# Patient Record
Sex: MALE | Race: WHITE | NOT HISPANIC OR LATINO | URBAN - METROPOLITAN AREA
[De-identification: names, ages, dates, MRNs, and addresses within clinical notes are randomized per-mention and may not be internally consistent; named-entity substitution may affect disease eponyms.]

---

## 2017-03-18 ENCOUNTER — INPATIENT (INPATIENT)
Facility: HOSPITAL | Age: 27
LOS: 2 days | Discharge: HOME | End: 2017-03-21
Attending: PSYCHIATRY & NEUROLOGY | Admitting: PSYCHIATRY & NEUROLOGY
Payer: COMMERCIAL

## 2017-03-18 VITALS — HEIGHT: 71 IN | WEIGHT: 229.94 LBS

## 2017-03-18 DIAGNOSIS — R69 ILLNESS, UNSPECIFIED: ICD-10-CM

## 2017-03-18 DIAGNOSIS — F12.19 CANNABIS ABUSE WITH UNSPECIFIED CANNABIS-INDUCED DISORDER: ICD-10-CM

## 2017-03-18 DIAGNOSIS — F25.0 SCHIZOAFFECTIVE DISORDER, BIPOLAR TYPE: ICD-10-CM

## 2017-03-18 LAB
ALBUMIN SERPL ELPH-MCNC: 3.6 G/DL — SIGNIFICANT CHANGE UP (ref 3.3–5)
ALP SERPL-CCNC: 86 U/L — SIGNIFICANT CHANGE UP (ref 40–120)
ALT FLD-CCNC: 46 U/L — SIGNIFICANT CHANGE UP (ref 12–78)
ANION GAP SERPL CALC-SCNC: 9 MMOL/L — SIGNIFICANT CHANGE UP (ref 5–17)
APAP SERPL-MCNC: <2 UG/ML — LOW (ref 10–30)
APPEARANCE UR: CLEAR — SIGNIFICANT CHANGE UP
AST SERPL-CCNC: 25 U/L — SIGNIFICANT CHANGE UP (ref 15–37)
BACTERIA # UR AUTO: (no result)
BASOPHILS # BLD AUTO: 0.2 K/UL — SIGNIFICANT CHANGE UP (ref 0–0.2)
BASOPHILS NFR BLD AUTO: 1.7 % — SIGNIFICANT CHANGE UP (ref 0–2)
BILIRUB SERPL-MCNC: 0.3 MG/DL — SIGNIFICANT CHANGE UP (ref 0.2–1.2)
BILIRUB UR-MCNC: NEGATIVE — SIGNIFICANT CHANGE UP
BUN SERPL-MCNC: 10 MG/DL — SIGNIFICANT CHANGE UP (ref 7–23)
CALCIUM SERPL-MCNC: 8.4 MG/DL — LOW (ref 8.5–10.1)
CHLORIDE SERPL-SCNC: 106 MMOL/L — SIGNIFICANT CHANGE UP (ref 96–108)
CO2 SERPL-SCNC: 27 MMOL/L — SIGNIFICANT CHANGE UP (ref 22–31)
COLOR SPEC: YELLOW — SIGNIFICANT CHANGE UP
CREAT SERPL-MCNC: 0.87 MG/DL — SIGNIFICANT CHANGE UP (ref 0.5–1.3)
DIFF PNL FLD: NEGATIVE — SIGNIFICANT CHANGE UP
EOSINOPHIL # BLD AUTO: 0.2 K/UL — SIGNIFICANT CHANGE UP (ref 0–0.5)
EOSINOPHIL NFR BLD AUTO: 1.8 % — SIGNIFICANT CHANGE UP (ref 0–6)
EPI CELLS # UR: SIGNIFICANT CHANGE UP
ETHANOL SERPL-MCNC: <10 MG/DL — SIGNIFICANT CHANGE UP (ref 0–10)
GLUCOSE SERPL-MCNC: 86 MG/DL — SIGNIFICANT CHANGE UP (ref 70–99)
GLUCOSE UR QL: NEGATIVE MG/DL — SIGNIFICANT CHANGE UP
HCT VFR BLD CALC: 41.1 % — SIGNIFICANT CHANGE UP (ref 39–50)
HGB BLD-MCNC: 14.3 G/DL — SIGNIFICANT CHANGE UP (ref 13–17)
KETONES UR-MCNC: NEGATIVE — SIGNIFICANT CHANGE UP
LEUKOCYTE ESTERASE UR-ACNC: (no result)
LYMPHOCYTES # BLD AUTO: 3.7 K/UL — HIGH (ref 1–3.3)
LYMPHOCYTES # BLD AUTO: 36.6 % — SIGNIFICANT CHANGE UP (ref 13–44)
MCHC RBC-ENTMCNC: 30.2 PG — SIGNIFICANT CHANGE UP (ref 27–34)
MCHC RBC-ENTMCNC: 34.8 GM/DL — SIGNIFICANT CHANGE UP (ref 32–36)
MCV RBC AUTO: 86.6 FL — SIGNIFICANT CHANGE UP (ref 80–100)
MONOCYTES # BLD AUTO: 0.6 K/UL — SIGNIFICANT CHANGE UP (ref 0–0.9)
MONOCYTES NFR BLD AUTO: 6.3 % — SIGNIFICANT CHANGE UP (ref 2–14)
NEUTROPHILS # BLD AUTO: 5.5 K/UL — SIGNIFICANT CHANGE UP (ref 1.8–7.4)
NEUTROPHILS NFR BLD AUTO: 53.6 % — SIGNIFICANT CHANGE UP (ref 43–77)
NITRITE UR-MCNC: NEGATIVE — SIGNIFICANT CHANGE UP
PH UR: 6 — SIGNIFICANT CHANGE UP (ref 4.8–8)
PLATELET # BLD AUTO: 237 K/UL — SIGNIFICANT CHANGE UP (ref 150–400)
POTASSIUM SERPL-MCNC: 3.8 MMOL/L — SIGNIFICANT CHANGE UP (ref 3.5–5.3)
POTASSIUM SERPL-SCNC: 3.8 MMOL/L — SIGNIFICANT CHANGE UP (ref 3.5–5.3)
PROT SERPL-MCNC: 6.9 GM/DL — SIGNIFICANT CHANGE UP (ref 6–8.3)
PROT UR-MCNC: NEGATIVE MG/DL — SIGNIFICANT CHANGE UP
RBC # BLD: 4.75 M/UL — SIGNIFICANT CHANGE UP (ref 4.2–5.8)
RBC # FLD: 11.9 % — SIGNIFICANT CHANGE UP (ref 10.3–14.5)
RBC CASTS # UR COMP ASSIST: (no result) /HPF (ref 0–4)
SALICYLATES SERPL-MCNC: 3.1 MG/DL — SIGNIFICANT CHANGE UP (ref 2.8–20)
SODIUM SERPL-SCNC: 142 MMOL/L — SIGNIFICANT CHANGE UP (ref 135–145)
SP GR SPEC: 1.01 — SIGNIFICANT CHANGE UP (ref 1.01–1.02)
TSH SERPL-MCNC: 0.88 UU/ML — SIGNIFICANT CHANGE UP (ref 0.36–3.74)
UROBILINOGEN FLD QL: NEGATIVE MG/DL — SIGNIFICANT CHANGE UP
WBC # BLD: 10.2 K/UL — SIGNIFICANT CHANGE UP (ref 3.8–10.5)
WBC # FLD AUTO: 10.2 K/UL — SIGNIFICANT CHANGE UP (ref 3.8–10.5)
WBC UR QL: SIGNIFICANT CHANGE UP

## 2017-03-18 PROCEDURE — 99285 EMERGENCY DEPT VISIT HI MDM: CPT

## 2017-03-18 RX ORDER — DIVALPROEX SODIUM 500 MG/1
500 TABLET, DELAYED RELEASE ORAL
Qty: 0 | Refills: 0 | Status: DISCONTINUED | OUTPATIENT
Start: 2017-03-18 | End: 2017-03-21

## 2017-03-18 RX ORDER — DOCUSATE SODIUM 100 MG
100 CAPSULE ORAL DAILY
Qty: 0 | Refills: 0 | Status: DISCONTINUED | OUTPATIENT
Start: 2017-03-18 | End: 2017-03-21

## 2017-03-18 RX ORDER — ACETAMINOPHEN 500 MG
650 TABLET ORAL EVERY 6 HOURS
Qty: 0 | Refills: 0 | Status: DISCONTINUED | OUTPATIENT
Start: 2017-03-18 | End: 2017-03-21

## 2017-03-18 RX ORDER — FLUPHENAZINE HYDROCHLORIDE 1 MG/1
25 TABLET, FILM COATED ORAL ONCE
Qty: 0 | Refills: 0 | Status: COMPLETED | OUTPATIENT
Start: 2017-03-18 | End: 2017-03-18

## 2017-03-18 RX ORDER — DIVALPROEX SODIUM 500 MG/1
250 TABLET, DELAYED RELEASE ORAL AT BEDTIME
Qty: 0 | Refills: 0 | Status: DISCONTINUED | OUTPATIENT
Start: 2017-03-18 | End: 2017-03-21

## 2017-03-18 RX ORDER — FLUPHENAZINE HYDROCHLORIDE 1 MG/1
10 TABLET, FILM COATED ORAL
Qty: 0 | Refills: 0 | Status: DISCONTINUED | OUTPATIENT
Start: 2017-03-18 | End: 2017-03-21

## 2017-03-18 RX ORDER — BENZTROPINE MESYLATE 1 MG
1 TABLET ORAL
Qty: 0 | Refills: 0 | Status: DISCONTINUED | OUTPATIENT
Start: 2017-03-18 | End: 2017-03-21

## 2017-03-18 RX ADMIN — DIVALPROEX SODIUM 250 MILLIGRAM(S): 500 TABLET, DELAYED RELEASE ORAL at 23:05

## 2017-03-18 RX ADMIN — DIVALPROEX SODIUM 500 MILLIGRAM(S): 500 TABLET, DELAYED RELEASE ORAL at 20:48

## 2017-03-18 RX ADMIN — Medication 100 MILLIGRAM(S): at 20:48

## 2017-03-18 RX ADMIN — FLUPHENAZINE HYDROCHLORIDE 25 MILLIGRAM(S): 1 TABLET, FILM COATED ORAL at 13:30

## 2017-03-18 RX ADMIN — Medication 1 MILLIGRAM(S): at 20:48

## 2017-03-18 RX ADMIN — Medication 1 MILLIGRAM(S): at 21:00

## 2017-03-18 NOTE — ED STATDOCS - NS ED MD SCRIBE ATTENDING SCRIBE SECTIONS
INTAKE ASSESSMENT/SCREENINGS/RESULTS/HIV/PROGRESS NOTE/HISTORY OF PRESENT ILLNESS/REVIEW OF SYSTEMS/PAST MEDICAL/SURGICAL/SOCIAL HISTORY/PHYSICAL EXAM

## 2017-03-18 NOTE — ED PROVIDER NOTE - MEDICAL DECISION MAKING DETAILS
M schizophrenic ambulatory to ED w/ susan re: recent worsening clinically of his psych. cond.  [facility] didn't have his IM Prolixin yesterday at his appt.  Labs, IM Prolixin, 1:1, UTox, observe, reassess.    Psych consult.

## 2017-03-18 NOTE — ED BEHAVIORAL HEALTH ASSESSMENT NOTE - SUICIDE RISK FACTORS
Agitation/severe anxiety/Perceived burden on family and others/Command hallucinations to hurt self/Highly impulsive behavior/Substance abuse/dependence

## 2017-03-18 NOTE — ED ADULT NURSE NOTE - OBJECTIVE STATEMENT
Pt reports he needs refill for his prolyxin.  Per family they were able to fill prescription this a.m. however pt did not have syringe for administration.  Family also states that pt has bee "manic" and that he normally is on prolyxin q4wks and that dosage was changed to q5 weeks.  Pt appears anxious, is cooperative.

## 2017-03-18 NOTE — ED BEHAVIORAL HEALTH ASSESSMENT NOTE - DESCRIPTION
Patient was agitated on arrival by cooperated with injection and has been relative calm since. none single, never , has been living with mother and her boyfriend for the past 4 years

## 2017-03-18 NOTE — ED BEHAVIORAL HEALTH ASSESSMENT NOTE - SUMMARY
Patient is a 26 year old single white male with a 5 or more year history of severe chronic mental illness and substance use disorder (THC primarily).  Patient was agitated and threatening this morning so his mother and her boyfriend brought him to the hospital, reporting that he has been noncompliant and out of control.  Patient was given Prolixin Dec 25mg IM this morning and was generally calm for my interview this evening.  Despite this family can not manage him at home in this the state he has been in for the past few weeks.  He needs further psychiatric evaluation and to be more stable in mood and behavior. He agreed to a 9.13 admission with the hopes of a short hospitalization and "fine tuning" his medications.  Dr. Jennifer Kelly assigned to his case.

## 2017-03-18 NOTE — ED BEHAVIORAL HEALTH ASSESSMENT NOTE - CURRENT MEDICATION
Depakote ER 1250 mg po hs  Prolixin 10 mg po q 12 hr  cogentin 1mg,  prolixin decanoate 100 mg q 4 weeks  lexapro 20 mg po q am

## 2017-03-18 NOTE — ED PROVIDER NOTE - CHPI ED SYMPTOMS NEG
no disorientation/no suicidal/no agitation/no hallucinations/no change in level of consciousness/no confusion

## 2017-03-18 NOTE — ED BEHAVIORAL HEALTH ASSESSMENT NOTE - HPI (INCLUDE ILLNESS QUALITY, SEVERITY, DURATION, TIMING, CONTEXT, MODIFYING FACTORS, ASSOCIATED SIGNS AND SYMPTOMS)
Patient has five year history of severe chronic mental illness and substance abuse.  Discharged from  with working diagnosis of schizoaffective disorder,  5 inpatient psychiatric treatments for the same diagnosis, last being May and June 2016. Patient also has a THC use disorder, and history of alcohol use disorder as well. The patient was discharged from  on depakote 1250mg daily, prolixin 10 mg po q 12 hr + prolixin decanoate 100mg q 3 weeks and assigned an ACT team associated with [facility].  According to the patient Dr. Duckworth, was willing to let him go increase the duration between   decanoate injection to q 4 weeks from q 3. The patient mother says she has to get him to go to get his injection. His mother's boyfriend says the patient has been driving under the influence of THC in a dangerous manner. The patient mother reports increase THC use, hanging out and being taken advantage by drug using persons, and she believes he may be taking other drugs.  She reports the girl friend he describes is not real, and that this morning he talks to persons not there and was agitated and manic.  Patient admits to hearing voices earlier today, but reports feeling better after he got a shot.

## 2017-03-18 NOTE — ED PROVIDER NOTE - CONSTITUTIONAL, MLM
normal... Well appearing, well nourished, awake, alert, oriented to person, place, time/situation and in no apparent distress. Non toxic

## 2017-03-18 NOTE — ED PROVIDER NOTE - DETAILS:
The scribe's documentation has been prepared under my direction and personally reviewed by me in its entirety. I confirm that the note above accurately reflects all work, treatment, procedures, and medical decision making performed by me (Dr. Johnson).

## 2017-03-18 NOTE — ED ADULT NURSE NOTE - OTHER COMPLAINTS
pt need injection of prolexin, pt brought own medication but does not have needles for injection pt need injection of prolixin, pt brought own medication but does not have needles for injection

## 2017-03-18 NOTE — ED STATDOCS - PROGRESS NOTE DETAILS
25 y/o M PMHx Schizophrenia, smokes, marijuana, etoh use, presents to the ED s/p meds refill. The pt has agitation, talks to himself, drinking etoh and marijuana. The pt has missed his injection which he takes every 4 weeks. Pt's mother notes that the pt said "lets get f*** up and die. No  cp, sob, headache, fever, chills, dizziness, abd pain or nvd. Charlette evaluation in [facility].

## 2017-03-18 NOTE — ED ADULT NURSE REASSESSMENT NOTE - NS ED NURSE REASSESS COMMENT FT1
per mother pt does have hx hearing voices, mother states she observed pt talking to self prior to arrival.

## 2017-03-18 NOTE — ED BEHAVIORAL HEALTH ASSESSMENT NOTE - SUICIDE PROTECTIVE FACTORS
Future oriented/Engaged in work or school/Positive therapeutic relationships/Responsibility to family and others

## 2017-03-18 NOTE — ED BEHAVIORAL HEALTH ASSESSMENT NOTE - DETAILS
patient reported slammed his hand on a table today and kicking things, has been aggressive to property in the past grandmother had schizophrenia father had history of cocaine use, uncle had meth abuse, none Dr. Barnard 5 N

## 2017-03-19 RX ADMIN — DIVALPROEX SODIUM 500 MILLIGRAM(S): 500 TABLET, DELAYED RELEASE ORAL at 21:08

## 2017-03-19 RX ADMIN — FLUPHENAZINE HYDROCHLORIDE 10 MILLIGRAM(S): 1 TABLET, FILM COATED ORAL at 21:08

## 2017-03-19 RX ADMIN — DIVALPROEX SODIUM 250 MILLIGRAM(S): 500 TABLET, DELAYED RELEASE ORAL at 21:09

## 2017-03-19 RX ADMIN — Medication 1 MILLIGRAM(S): at 21:08

## 2017-03-19 RX ADMIN — DIVALPROEX SODIUM 500 MILLIGRAM(S): 500 TABLET, DELAYED RELEASE ORAL at 09:13

## 2017-03-19 RX ADMIN — Medication 100 MILLIGRAM(S): at 09:13

## 2017-03-19 RX ADMIN — FLUPHENAZINE HYDROCHLORIDE 10 MILLIGRAM(S): 1 TABLET, FILM COATED ORAL at 09:13

## 2017-03-19 RX ADMIN — Medication 1 MILLIGRAM(S): at 09:13

## 2017-03-19 NOTE — PROGRESS NOTE BEHAVIORAL HEALTH - NSBHFUPADDHPIFT_PSY_A_CORE
agitated, manic, talking to himself    Patient has five year history of severe chronic mental illness and substance abuse.  Discharged from  with working diagnosis of schizoaffective disorder,  5 inpatient psychiatric treatments for the same diagnosis, last being May and June 2016. Patient also has a THC use disorder, and history of alcohol use disorder as well. The patient was discharged from  on depakote 1250mg daily, prolixin 10 mg po q 12 hr + prolixin decanoate 100mg q 3 weeks and assigned an ACT team associated with [facility].  According to the patient Dr. Duckworth, was willing to let him go increase the duration between   decanoate injection to q 4 weeks from q 3. The patient mother says she has to get him to go to get his injection. His mother's boyfriend says the patient has been driving under the influence of THC in a dangerous manner. The patient mother reports increase THC use, hanging out and being taken advantage by drug using persons, and she believes he may be taking other drugs.  She reports the girl friend he describes is not real, and that this morning he talks to persons not there and was agitated and manic.  Patient admits to hearing voices earlier today, but reports feeling better after he got a shot.    Depakote ER 1250 mg po hs  Prolixin 10 mg po q 12 hr  cogentin 1mg,  prolixin decanoate 100 mg q 4 weeks  lexapro 20 mg po q am agitated, manic, talking to himself    Patient has five year history of severe chronic mental illness and substance abuse.  Discharged from  with working diagnosis of schizoaffective disorder,  5 inpatient psychiatric treatments for the same diagnosis, last being May and June 2016. Patient also has a THC use disorder, and history of alcohol use disorder as well. The patient was discharged from  on depakote 1250mg daily, prolixin 10 mg po q 12 hr + prolixin decanoate 100mg q 3 weeks and assigned an ACT team associated with [facility].  According to the patient Dr. Duckworth, was willing to let him go increase the duration between   decanoate injection to q 4 weeks from q 3. The patient mother says she has to get him to go to get his injection. His mother's boyfriend says the patient has been driving under the influence of THC in a dangerous manner. The patient mother reports increase THC use, hanging out and being taken advantage by drug using persons, and she believes he may be taking other drugs.  She reports the girl friend he describes is not real, and that this morning he talks to persons not there and was agitated and manic.  Patient admits to hearing voices earlier today, but reports feeling better after he got a shot.    Depakote ER 1250 mg po hs  Prolixin 10 mg po q 12 hr  cogentin 1mg,  prolixin decanoate 100 mg q 4 weeks  lexapro 20 mg po q am    was given prolixin dec 25 mg in ED on 3/18

## 2017-03-20 DIAGNOSIS — F10.10 ALCOHOL ABUSE, UNCOMPLICATED: ICD-10-CM

## 2017-03-20 LAB — VALPROATE SERPL-MCNC: 76 UG/ML — SIGNIFICANT CHANGE UP (ref 50–100)

## 2017-03-20 RX ADMIN — FLUPHENAZINE HYDROCHLORIDE 10 MILLIGRAM(S): 1 TABLET, FILM COATED ORAL at 09:32

## 2017-03-20 RX ADMIN — Medication 1 MILLIGRAM(S): at 09:32

## 2017-03-20 RX ADMIN — FLUPHENAZINE HYDROCHLORIDE 10 MILLIGRAM(S): 1 TABLET, FILM COATED ORAL at 21:43

## 2017-03-20 RX ADMIN — Medication 100 MILLIGRAM(S): at 09:32

## 2017-03-20 RX ADMIN — Medication 1 MILLIGRAM(S): at 21:43

## 2017-03-20 RX ADMIN — DIVALPROEX SODIUM 250 MILLIGRAM(S): 500 TABLET, DELAYED RELEASE ORAL at 21:43

## 2017-03-20 RX ADMIN — DIVALPROEX SODIUM 500 MILLIGRAM(S): 500 TABLET, DELAYED RELEASE ORAL at 09:32

## 2017-03-20 RX ADMIN — DIVALPROEX SODIUM 500 MILLIGRAM(S): 500 TABLET, DELAYED RELEASE ORAL at 21:43

## 2017-03-20 RX ADMIN — Medication 1 MILLIGRAM(S): at 16:07

## 2017-03-20 NOTE — PROGRESS NOTE BEHAVIORAL HEALTH - NSBHFUPINTERVALCCFT_PSY_A_CORE
" I think I am doing fine. I just get these double and triple thoughts and it makes it hard to concentrate sometimes."
Reports he is not hearing voices.  Would like to return to school and work  3 day letter submited as he wishes to be discharged

## 2017-03-20 NOTE — CONSULT NOTE ADULT - SUBJECTIVE AND OBJECTIVE BOX
PCP - Philip Millan ([facility])    HISTORY OF THE PRESENT ILLNESS:  26 M with Hx of Schizophrenia on several antipsychotic meds presented to the ED after he felt that his Prolixin injection was [facility]  While in the ED, his mother was concerned about his recent behavior and his schizophrenia was getting worse.  Apparently, he has been drinking alcohol and using marijuana more frequently.    PAST MEDICAL HISTORY:  Schizophrenia    PAST SURGICAL HISTORY:  NONE    FAMILY HISTORY:   non-contributory to the patient's current presentation    SOCIAL HISTORY:  +smokes 1pk/day, used marijuana 4 days ago, no alcohol    REVIEW OF SYSTEMS:   All 10 systems reviewed in detailed and found to be negative with the exception of what has already been described above    MEDICATIONS  (STANDING):  fluPHENAZine 10milliGRAM(s) Oral two times a day  benztropine 1milliGRAM(s) Oral two times a day  diVALproex DR 500milliGRAM(s) Oral two times a day  diVALproex DR 250milliGRAM(s) Oral at bedtime  docusate sodium 100milliGRAM(s) Oral daily    MEDICATIONS  (PRN):  LORazepam     Tablet 1milliGRAM(s) Oral four times a day PRN Agitation  LORazepam   Injectable 2milliGRAM(s) IntraMuscular once PRN severe agitation  acetaminophen   Tablet 650milliGRAM(s) Oral every 6 hours PRN pain  aluminum hydroxide/magnesium hydroxide/simethicone Suspension 30milliLiter(s) Oral every 4 hours PRN Dyspepsia      VITALS SIGNS:  T(F): 99.6, Max: 99.6 (03-20 @ 07:29)  HR: 56 (56 - 56)  BP: 111/73 (111/73 - 111/73)  RR: 16 (16 - 16)  SpO2: 100% (100% - 100%)  Wt(kg): --      PHYSICAL EXAM:    HEENT:  pupils equal and reactive, EOMI, no oropharyngeal lesions, erythema, exudates, oral thrush    NECK:   supple, no carotid bruits, no palpable lymph nodes, no thyromegaly    CV:  +S1, +S2, regular, no murmurs or rubs    RESP:   lungs clear to auscultation bilaterally, no wheezing, rales, rhonchi, good air entry bilaterally    BREAST:  not examined    GI:  abdomen soft, non-tender, non-distended, normal BS, no bruits, no abdominal masses, no palpable masses    RECTAL:  not examined    :  not examined    MSK:   normal muscle tone, no atrophy, no rigidity, no contractions    EXT:   no clubbing, no cyanosis, no edema, no calf pain, swelling or erythema    VASCULAR:  pulses equal and symmetric in the upper and lower extremities    NEURO:  AAOX3, no focal neurological deficits, follows all commands, able to move extremities spontaneously    SKIN:  no ulcers, lesions or rashes    LABS:                            14.3   10.2  )-----------( 237      ( 18 Mar 2017 15:01 )             41.1     18 Mar 2017 15:01    142    |  106    |  10     ----------------------------<  86     3.8     |  27     |  0.87     Ca    8.4        18 Mar 2017 15:01    TPro  6.9    /  Alb  3.6    /  TBili  0.3    /  DBili  x      /  AST  25     /  ALT  46     /  AlkPhos  86     18 Mar 2017 15:01    LIVER FUNCTIONS - ( 18 Mar 2017 15:01 )  Alb: 3.6 g/dL / Pro: 6.9 gm/dL / ALK PHOS: 86 U/L / ALT: 46 U/L / AST: 25 U/L / GGT: x           Urinalysis Basic - ( 18 Mar 2017 15:01 )    Color: Yellow / Appearance: Clear / S.015 / pH: x  Gluc: x / Ketone: Negative  / Bili: Negative / Urobili: Negative mg/dL   Blood: x / Protein: Negative mg/dL / Nitrite: Negative   Leuk Esterase: Trace / RBC: 3-5 /HPF / WBC 0-2   Sq Epi: x / Non Sq Epi: Occasional / Bacteria: Few      IMPRESSION:    ACUTE PSYCHOSIS IN PATIENT WITH CHRONIC SCHIZOPHRENIA    SMOKING DEPENDENCE        RECOMMENDATIONS:  -  medically stable for admission to   -  primary management per psych  -  continue anti-psychotic meds per psych  -  counseled on smoking cessation  -  patient refusing Nicotine patch at this time  -  will sign off    d/w patient and psych RN

## 2017-03-20 NOTE — PROGRESS NOTE BEHAVIORAL HEALTH - NSBHCHARTREVIEWVS_PSY_A_CORE FT
Vital Signs Last 24 Hrs  T(C): 36.6, Max: 36.7 (03-18 @ 19:49)  T(F): 97.8, Max: 98 (03-18 @ 19:49)  HR: 57 (57 - 68)  BP: 127/67 (121/65 - 127/67)  BP(mean): --  RR: 18 (17 - 18)  SpO2: 100% (97% - 100%)
Vital Signs Last 24 Hrs  T(C): 36.6, Max: 36.7 (03-18 @ 19:49)  T(F): 97.8, Max: 98 (03-18 @ 19:49)  HR: 57 (57 - 68)  BP: 127/67 (121/65 - 127/67)  BP(mean): --  RR: 18 (17 - 18)  SpO2: 100% (97% - 100%)

## 2017-03-20 NOTE — PROGRESS NOTE BEHAVIORAL HEALTH - NSBHFUPINTERVALHXFT_PSY_A_CORE
MEDICATIONS  (STANDING):  fluPHENAZine 10milliGRAM(s) Oral two times a day  benztropine 1milliGRAM(s) Oral two times a day  diVALproex DR 500milliGRAM(s) Oral two times a day  diVALproex DR 250milliGRAM(s) Oral at bedtime  docusate sodium 100milliGRAM(s) Oral daily    MEDICATIONS  (PRN):  LORazepam     Tablet 1milliGRAM(s) Oral four times a day PRN Agitation  LORazepam   Injectable 2milliGRAM(s) IntraMuscular once PRN severe agitation  acetaminophen   Tablet 650milliGRAM(s) Oral every 6 hours PRN pain  aluminum hydroxide/magnesium hydroxide/simethicone Suspension 30milliLiter(s) Oral every 4 hours PRN Dyspepsia
Pt seen. Pt remains somewhat thought disordered with what pt describes as " double or triple thoughts  that get in my way ."   Lengthy discussion with the pt as to the strong medical recommendation that the pt not continue his heavy THC use and intermittent ETOH use. Pt appeared distracted and baffled by hospital staff and outpatient treatment team concerns regarding the pt's comorbid, significant substance use disorders.   Pt continued to try to minimize and to downplay his comorbid substance use.   Pt spoke of plans to continue his job at a local [employer] and to resume his classes at [employer] where the pt is taking statistics  and 'effective reading' courses with "80's " grades.  Pt reported good sleep and appetite since his admission to hospital . He denied SI HI and denied current AH though pt spoke of ' When I get into an argument or I get upset, that's when the voices come. I guess I am weak."  Pt was amenable to continue current med regimen of PO Prolixin and decanoate , though the decanoate current  dose  was apparently lowered to 25 mg IM  q 4 weeks rather than prior dose of Prolixin 100 mg IM q 4 weeks  at the time of pt DC from hospital during pt's last inpatient admission in 2016.with interval increased to q 4 weeks.   Writer reviewed the pt's medication regimen and the purpose of each medication along with the need at times to change the timing of doses according to the pt's clinical symptoms. Pt amenable to resume q 3 weekly Prolixin decanoate at this time due to the pt's recent worsening psychosis .  Discussion with hospital staff reporting that [facility] has decided to not have the pt return there for treatment in light of the pt's having not followed up as planned with the ACT team  after the pt's most recent DC from [facility] in 2016.  The pt 's judgment and insight remain rather poor.  The pt submitted a 72 hr letter requesting DC home . (3/21/17) . Pt amenable to allow additional time to reschedule pt outpatient  follow up . Pt continues to decline all staff efforts to have the pt involved in dual diagnosis treatment of his SAD and his comorbid and significant THC and ETOH use disorders.

## 2017-03-20 NOTE — PROGRESS NOTE BEHAVIORAL HEALTH - NSBHCHARTREVIEWINVESTIGATE_PSY_A_CORE FT
MEDICATIONS  (STANDING):  fluPHENAZine 10milliGRAM(s) Oral two times a day  benztropine 1milliGRAM(s) Oral two times a day  diVALproex DR 500milliGRAM(s) Oral two times a day  diVALproex DR 250milliGRAM(s) Oral at bedtime  docusate sodium 100milliGRAM(s) Oral daily    MEDICATIONS  (PRN):  LORazepam     Tablet 1milliGRAM(s) Oral four times a day PRN Agitation  LORazepam   Injectable 2milliGRAM(s) IntraMuscular once PRN severe agitation  acetaminophen   Tablet 650milliGRAM(s) Oral every 6 hours PRN pain  aluminum hydroxide/magnesium hydroxide/simethicone Suspension 30milliLiter(s) Oral every 4 hours PRN Dyspepsia

## 2017-03-20 NOTE — PROGRESS NOTE BEHAVIORAL HEALTH - RISK ASSESSMENT
History of aggression to property  Psychotic illness and substance use are risk factors.     Low risk for suicide or aggression on unit as he is accepting treatment and has limited psychotic symptoms
History of aggression to property  Psychotic illness and substance use are risk factors.     Low risk for suicide or aggression on unit as he is accepting treatment and has limited psychotic symptoms

## 2017-03-20 NOTE — PROGRESS NOTE BEHAVIORAL HEALTH - NSBHADMITIPREASON_PSY_A_CORE
Danger to self; mental illness expected to respond to inpatient care
Danger to self; mental illness expected to respond to inpatient care

## 2017-03-20 NOTE — PROGRESS NOTE BEHAVIORAL HEALTH - SUMMARY
Patient is a 26 year old single white male with a 5 or more year history of severe chronic mental illness and substance use disorder (THC primarily).  Patient was agitated and threatening yesterday morning so his mother and her boyfriend brought him to the hospital, reporting that he has been noncompliant and out of control.    Patient was given Prolixin Dec 25mg IM this morning and was generally calm for my interview this evening.  Despite this family can not manage him at home in this the state he has been in for the past few weeks.  He needs further psychiatric evaluation and to be more stable in mood and behavior. He agreed to a 9.13 admission with the hopes of a short hospitalization and "fine tuning" his medications.
Patient is a 26 year old single white male with a 5 or more year history of severe chronic mental illness and substance use disorder (THC primarily).  Patient was agitated and threatening yesterday morning so his mother and her boyfriend brought him to the hospital, reporting that he has been noncompliant and out of control.    Patient was given Prolixin Dec 25mg IM this morning and was generally calm for my interview this evening.  Despite this family can not manage him at home in this the state he has been in for the past few weeks.  He needs further psychiatric evaluation and to be more stable in mood and behavior. He agreed to a 9.13 admission with the hopes of a short hospitalization and "fine tuning" his medications.

## 2017-03-21 VITALS
TEMPERATURE: 98 F | DIASTOLIC BLOOD PRESSURE: 62 MMHG | RESPIRATION RATE: 16 BRPM | HEART RATE: 116 BPM | SYSTOLIC BLOOD PRESSURE: 102 MMHG | OXYGEN SATURATION: 100 %

## 2017-03-21 RX ORDER — BENZTROPINE MESYLATE 1 MG
1 TABLET ORAL
Qty: 28 | Refills: 1
Start: 2017-03-21 | End: 2017-04-17

## 2017-03-21 RX ORDER — FLUPHENAZINE HYDROCHLORIDE 1 MG/1
25 TABLET, FILM COATED ORAL ONCE
Qty: 0 | Refills: 0 | Status: COMPLETED | OUTPATIENT
Start: 2017-03-21 | End: 2017-03-21

## 2017-03-21 RX ORDER — DIVALPROEX SODIUM 500 MG/1
1 TABLET, DELAYED RELEASE ORAL
Qty: 14 | Refills: 1
Start: 2017-03-21 | End: 2017-04-17

## 2017-03-21 RX ORDER — DIVALPROEX SODIUM 500 MG/1
1 TABLET, DELAYED RELEASE ORAL
Qty: 28 | Refills: 1
Start: 2017-03-21 | End: 2017-04-17

## 2017-03-21 RX ORDER — FLUPHENAZINE HYDROCHLORIDE 1 MG/1
1 TABLET, FILM COATED ORAL
Qty: 0 | Refills: 0 | DISCHARGE
Start: 2017-03-21

## 2017-03-21 RX ADMIN — DIVALPROEX SODIUM 500 MILLIGRAM(S): 500 TABLET, DELAYED RELEASE ORAL at 09:42

## 2017-03-21 RX ADMIN — FLUPHENAZINE HYDROCHLORIDE 25 MILLIGRAM(S): 1 TABLET, FILM COATED ORAL at 11:43

## 2017-03-21 RX ADMIN — FLUPHENAZINE HYDROCHLORIDE 10 MILLIGRAM(S): 1 TABLET, FILM COATED ORAL at 09:42

## 2017-03-21 RX ADMIN — Medication 100 MILLIGRAM(S): at 09:42

## 2017-03-21 RX ADMIN — Medication 1 MILLIGRAM(S): at 09:42

## 2017-03-21 NOTE — DISCHARGE NOTE BEHAVIORAL HEALTH - SECONDARY DIAGNOSIS.
Cannabis abuse with cannabis-induced disorder Alcohol use disorder, mild, in early remission, in controlled environment

## 2017-03-21 NOTE — DISCHARGE NOTE BEHAVIORAL HEALTH - NSBHDCMEDSFT_PSY_A_CORE
Pt amenable to resume higher dose of Prolixin decanoate ( previously 100 mg IM q 3 weeks) now 25 mg IM q 4 weeks prior to admission due likely to pt's ongoing efforts to decrease / DC all medications due to pt's limited insight into his illness

## 2017-03-21 NOTE — DISCHARGE NOTE BEHAVIORAL HEALTH - HPI (INCLUDE ILLNESS QUALITY, SEVERITY, DURATION, TIMING, CONTEXT, MODIFYING FACTORS, ASSOCIATED SIGNS AND SYMPTOMS)
Patient is a 26 year old single white male with a 5 or more year history of severe chronic mental illness and substance use disorder (THC primarily).  Patient was agitated and threatening yesterday morning so his mother and her boyfriend brought him to the hospital, reporting that he has been noncompliant and out of control.    Patient was given Prolixin Dec 25mg IM this morning and was generally calm for my interview this evening.  Despite this family can not manage him at home in this the state he has been in for the past few weeks.  He needs further psychiatric evaluation and to be more stable in mood and behavior. He agreed to a 9.13 admission with the hopes of a short hospitalization and "fine tuning" his medications.    Pt submitted a 72 hr letter upon arrival on 3/19/17 and will be DC'd home on 3/21/17. Patient is a 26 year old single white male with a 5 or more year history of severe chronic mental illness and substance use disorder (THC primarily).  Patient was agitated and threatening yesterday morning so his mother and her boyfriend brought him to the hospital, reporting that he has been noncompliant and out of control.    Patient was given Prolixin Dec 25mg IM this morning and was generally calm for my interview this evening.  Despite this family can not manage him at home in this the state he has been in for the past few weeks.  He needs further psychiatric evaluation and to be more stable in mood and behavior. He agreed to a 9.13 admission with the hopes of a short hospitalization and "fine tuning" his medications.    Pt submitted a 72 hr letter upon arrival on 3/19/17 and will be DC'd home on 3/21/17.    Discharge Medications  PLEASE NOTE     1.Depakote  mg po q am and 750 mg po qhs   Depakote level= 76 ug/ml ( 3/20/17)  2.PROLIXIN 10 MG PO Q 12 HRS  3.PROLIXIN DECANOATE DOSE INCREASED TO 50 MG im Q 3 WEEKLY  ( pt received Prolixin decanoate 50 mg IM total  during his brief inpatient admission). Pt received 25 mg IM on 3/19/17 and another Prolixin decanoate  25 mg IM on 3/21/17)  4.Cogentin 1 mg po q 12 hrs

## 2017-03-21 NOTE — DISCHARGE NOTE BEHAVIORAL HEALTH - MEDICATION SUMMARY - MEDICATIONS TO TAKE
I will START or STAY ON the medications listed below when I get home from the hospital:    divalproex sodium 500 mg oral delayed release tablet  -- 1 tab(s) by mouth 2 times a day MDD:total depakote dose 1250 mg po q day ( mood disorder)  -- Indication: For Schizoaffective disorder, bipolar type    divalproex sodium 250 mg oral delayed release tablet  -- 1 tab(s) by mouth once a day (at bedtime) MDD:total depakote dose 1250 mg po q day ( mood disorder)  -- Indication: For Schizoaffective disorder, bipolar type    benztropine 1 mg oral tablet  -- 1 tab(s) by mouth 2 times a day MDD:total 2 mg po q day ( EPS prophylaxis)  -- Indication: For EPS prophylaxis

## 2017-03-21 NOTE — DISCHARGE NOTE BEHAVIORAL HEALTH - NSBHDCCRISISPLAN2FT_PSY_A_CORE
call 911, call the crisis hotline number, tell your family, go to your nearest emergency room, call your doctor.

## 2017-03-21 NOTE — DISCHARGE NOTE BEHAVIORAL HEALTH - NSBHDCSWCOMMENTSFT_PSY_A_CORE
Spoke with mother to confirm d/c.  She is in agreement with referral to [facility] and will allow patient to return home with the understanding that he is not permitted to use drugs unless prescribed by a doctor,

## 2017-03-21 NOTE — DISCHARGE NOTE BEHAVIORAL HEALTH - CONDITIONS AT DISCHARGE
Patient is brighter, reports feeling better, dressed appropriately for age/season, denies AH/VH, denies SI/HI ideation, plan or intent.  Patient is looking forward to returning to school to finish out semester.

## 2017-03-24 DIAGNOSIS — F25.0 SCHIZOAFFECTIVE DISORDER, BIPOLAR TYPE: ICD-10-CM

## 2017-03-24 DIAGNOSIS — Z72.89 OTHER PROBLEMS RELATED TO LIFESTYLE: ICD-10-CM

## 2017-03-24 DIAGNOSIS — F17.200 NICOTINE DEPENDENCE, UNSPECIFIED, UNCOMPLICATED: ICD-10-CM

## 2017-03-24 DIAGNOSIS — F12.19 CANNABIS ABUSE WITH UNSPECIFIED CANNABIS-INDUCED DISORDER: ICD-10-CM

## 2017-05-18 ENCOUNTER — EMERGENCY (EMERGENCY)
Facility: HOSPITAL | Age: 27
LOS: 0 days | Discharge: HOME | End: 2017-05-18
Attending: EMERGENCY MEDICINE | Admitting: EMERGENCY MEDICINE
Payer: MEDICARE

## 2017-05-18 VITALS
TEMPERATURE: 98 F | OXYGEN SATURATION: 99 % | HEART RATE: 81 BPM | RESPIRATION RATE: 18 BRPM | DIASTOLIC BLOOD PRESSURE: 76 MMHG | SYSTOLIC BLOOD PRESSURE: 120 MMHG

## 2017-05-18 VITALS — HEIGHT: 70 IN

## 2017-05-18 DIAGNOSIS — Z76.0 ENCOUNTER FOR ISSUE OF REPEAT PRESCRIPTION: ICD-10-CM

## 2017-05-18 DIAGNOSIS — F20.89 OTHER SCHIZOPHRENIA: ICD-10-CM

## 2017-05-18 PROCEDURE — 99281 EMR DPT VST MAYX REQ PHY/QHP: CPT

## 2017-05-18 RX ORDER — DIVALPROEX SODIUM 500 MG/1
1 TABLET, DELAYED RELEASE ORAL
Qty: 28 | Refills: 1
Start: 2017-05-18 | End: 2017-06-14

## 2017-05-18 RX ORDER — BENZTROPINE MESYLATE 1 MG
1 TABLET ORAL
Qty: 28 | Refills: 1
Start: 2017-05-18 | End: 2017-06-14

## 2017-05-18 RX ORDER — DIVALPROEX SODIUM 500 MG/1
1 TABLET, DELAYED RELEASE ORAL
Qty: 14 | Refills: 1
Start: 2017-05-18 | End: 2017-06-14

## 2017-05-18 RX ORDER — FLUPHENAZINE HYDROCHLORIDE 1 MG/1
1 TABLET, FILM COATED ORAL
Qty: 28 | Refills: 1
Start: 2017-05-18 | End: 2017-06-14

## 2017-05-18 NOTE — SBIRT NOTE. - NSSBIRTSERVICES_GEN_A_ED_FT
Provided SBIRT services: Full screen positive. Brief Intervention Performed. Screening results were reviewed with the patient and patient was provided information about healthy guidelines and potential negative consequences associated with level of risk. Motivation and readiness to reduce or stop use was discussed and goals and activities to make changes were suggested/offered.  Audit Score: 2  DAST Score: 2  Duration = 15 Minutes

## 2017-05-18 NOTE — ED STATDOCS - OBJECTIVE STATEMENT
28 y/o male with a h/o schizophrenia, presenting to ED for medication refill. Pt states he ran out of his psychotropic medications today (prolexin, depakote, and benzotropion). He took the last doses today. He states he missed his appointment with his doctor and will not be able to get an appointment until June 1st. Pt says he last got his medication refilled a few months ago when he was admitted to  a few months ago. Pt denies SI/HI. Dr. Jung (PCP). Pt smokes marijuana on occasion.

## 2017-05-18 NOTE — ED STATDOCS - NS ED MD SCRIBE ATTENDING SCRIBE SECTIONS
PAST MEDICAL/SURGICAL/SOCIAL HISTORY/PHYSICAL EXAM/HISTORY OF PRESENT ILLNESS/PROGRESS NOTE/DISPOSITION/RESULTS/REVIEW OF SYSTEMS

## 2017-05-18 NOTE — ED ADULT TRIAGE NOTE - CHIEF COMPLAINT QUOTE
pt states he ran out of his psychotropic medications today (prolexin, depakote, and benzotropion). he states he missed his appointment with his doctor and will not be able to get an appointment until june 1st.

## 2017-06-01 ENCOUNTER — OUTPATIENT (OUTPATIENT)
Dept: OUTPATIENT SERVICES | Facility: HOSPITAL | Age: 27
LOS: 1 days | End: 2017-06-01

## 2017-06-22 DIAGNOSIS — R69 ILLNESS, UNSPECIFIED: ICD-10-CM

## 2017-12-19 ENCOUNTER — EMERGENCY (EMERGENCY)
Facility: HOSPITAL | Age: 27
LOS: 0 days | Discharge: HOME | End: 2017-12-19
Attending: EMERGENCY MEDICINE | Admitting: EMERGENCY MEDICINE
Payer: MEDICARE

## 2017-12-19 VITALS
TEMPERATURE: 98 F | DIASTOLIC BLOOD PRESSURE: 69 MMHG | OXYGEN SATURATION: 99 % | HEART RATE: 72 BPM | SYSTOLIC BLOOD PRESSURE: 126 MMHG

## 2017-12-19 DIAGNOSIS — Z76.0 ENCOUNTER FOR ISSUE OF REPEAT PRESCRIPTION: ICD-10-CM

## 2017-12-19 DIAGNOSIS — F20.89 OTHER SCHIZOPHRENIA: ICD-10-CM

## 2017-12-19 PROCEDURE — 99283 EMERGENCY DEPT VISIT LOW MDM: CPT

## 2017-12-19 NOTE — ED STATDOCS - OBJECTIVE STATEMENT
26 yo male pmh schizophrenia presents for refill of lamictal and injection of prolixin.  pt was getting treatment in [place] and recently moved back here a month ago.  states he usually gets his meds at urgent care.

## 2017-12-19 NOTE — ED ADULT TRIAGE NOTE - CHIEF COMPLAINT QUOTE
Pt brought in by mother for Prolixin injection.  Pt has medication, prescribed by Carine Castellanos in [place].  Pt has known schizophrenia, no symptoms or complaints on arrival.  Mother and pt state that pt needs Rx for Lamictal.

## 2017-12-19 NOTE — ED STATDOCS - MEDICAL DECISION MAKING DETAILS
Administered 50mg of Fluphenazine decanoate to right buttock, lot [id number] exp 11/18, will refill lamictal Rx.  pt ok for dc home. Administered 50mg of Fluphenazine decanoate to right buttock, lot [id number] exp 11/18, will refill lamictal Rx.  pt ok for dc home.  will f/u with PCP Dr. Jung. Administered 50mg of Fluphenazine decanoate to right buttock, lot [id number] exp 11/18,  Rx.  pt ok for dc home.  will f/u with PCP Dr. Jung.

## 2017-12-20 PROBLEM — F20.89 OTHER SCHIZOPHRENIA: Chronic | Status: ACTIVE | Noted: 2017-03-18

## 2018-02-06 NOTE — ED BEHAVIORAL HEALTH ASSESSMENT NOTE - NS ED BHA DEMOGRAPHICS MEDICAL RECORD REVIEWED CONSENT OBTAINED YN
Advised patient.  
Patient delivered vaginally 1/30/[birth year].  Yesterday she started having bad pelvic cramping that travels down her thigh and lower back.  Yesterday it affected her  left side and today her right.  She rates her pain a \"8: After taking Ibuprofen 400mg the pain comes down to a \"4\"  Patient is still having normal vaginal bleeding and is breastfeeding.  She is not having any fever or chills.  Will discuss with Dr. Harmon and call patient back.  
Since bleeding is not heavy and no fever I think this may be due to uterus luis e and decreasing in size and perhaps putting pressure on different sides of the pelvis. I would take a schedule of ibuprofen (600mg every 6 hours with food), use ice followed by heat, and observe. If not improving in a few days or if bleeding increases come in for exam.   
Yes

## 2019-10-07 ENCOUNTER — INPATIENT (INPATIENT)
Facility: HOSPITAL | Age: 29
LOS: 23 days | Discharge: HOME | DRG: 885 | End: 2019-10-31
Attending: PSYCHIATRY & NEUROLOGY | Admitting: PSYCHIATRY & NEUROLOGY
Payer: MEDICARE

## 2019-10-07 VITALS — HEIGHT: 70 IN | WEIGHT: 149.91 LBS

## 2019-10-07 DIAGNOSIS — F29 UNSPECIFIED PSYCHOSIS NOT DUE TO A SUBSTANCE OR KNOWN PHYSIOLOGICAL CONDITION: ICD-10-CM

## 2019-10-07 LAB
ALBUMIN SERPL ELPH-MCNC: 3.7 G/DL — SIGNIFICANT CHANGE UP (ref 3.3–5)
ALP SERPL-CCNC: 96 U/L — SIGNIFICANT CHANGE UP (ref 40–120)
ALT FLD-CCNC: 19 U/L — SIGNIFICANT CHANGE UP (ref 12–78)
ANION GAP SERPL CALC-SCNC: 6 MMOL/L — SIGNIFICANT CHANGE UP (ref 5–17)
APAP SERPL-MCNC: <2 UG/ML — LOW (ref 10–30)
APPEARANCE UR: CLEAR — SIGNIFICANT CHANGE UP
AST SERPL-CCNC: 19 U/L — SIGNIFICANT CHANGE UP (ref 15–37)
BASOPHILS # BLD AUTO: 0.1 K/UL — SIGNIFICANT CHANGE UP (ref 0–0.2)
BASOPHILS NFR BLD AUTO: 1 % — SIGNIFICANT CHANGE UP (ref 0–2)
BILIRUB SERPL-MCNC: 0.5 MG/DL — SIGNIFICANT CHANGE UP (ref 0.2–1.2)
BILIRUB UR-MCNC: NEGATIVE — SIGNIFICANT CHANGE UP
BUN SERPL-MCNC: 9 MG/DL — SIGNIFICANT CHANGE UP (ref 7–23)
CALCIUM SERPL-MCNC: 8.3 MG/DL — LOW (ref 8.5–10.1)
CHLORIDE SERPL-SCNC: 106 MMOL/L — SIGNIFICANT CHANGE UP (ref 96–108)
CO2 SERPL-SCNC: 28 MMOL/L — SIGNIFICANT CHANGE UP (ref 22–31)
COLOR SPEC: YELLOW — SIGNIFICANT CHANGE UP
CREAT SERPL-MCNC: 1.05 MG/DL — SIGNIFICANT CHANGE UP (ref 0.5–1.3)
DIFF PNL FLD: NEGATIVE — SIGNIFICANT CHANGE UP
EOSINOPHIL # BLD AUTO: 0.2 K/UL — SIGNIFICANT CHANGE UP (ref 0–0.5)
EOSINOPHIL NFR BLD AUTO: 2 % — SIGNIFICANT CHANGE UP (ref 0–6)
ETHANOL SERPL-MCNC: <10 MG/DL — SIGNIFICANT CHANGE UP (ref 0–10)
GLUCOSE SERPL-MCNC: 90 MG/DL — SIGNIFICANT CHANGE UP (ref 70–99)
GLUCOSE UR QL: NEGATIVE MG/DL — SIGNIFICANT CHANGE UP
HCT VFR BLD CALC: 43.7 % — SIGNIFICANT CHANGE UP (ref 39–50)
HGB BLD-MCNC: 14.5 G/DL — SIGNIFICANT CHANGE UP (ref 13–17)
IMM GRANULOCYTES NFR BLD AUTO: 0.2 % — SIGNIFICANT CHANGE UP (ref 0–1.5)
KETONES UR-MCNC: NEGATIVE — SIGNIFICANT CHANGE UP
LEUKOCYTE ESTERASE UR-ACNC: ABNORMAL
LYMPHOCYTES # BLD AUTO: 2.42 K/UL — SIGNIFICANT CHANGE UP (ref 1–3.3)
LYMPHOCYTES # BLD AUTO: 24.3 % — SIGNIFICANT CHANGE UP (ref 13–44)
MCHC RBC-ENTMCNC: 29.5 PG — SIGNIFICANT CHANGE UP (ref 27–34)
MCHC RBC-ENTMCNC: 33.2 GM/DL — SIGNIFICANT CHANGE UP (ref 32–36)
MCV RBC AUTO: 89 FL — SIGNIFICANT CHANGE UP (ref 80–100)
MONOCYTES # BLD AUTO: 0.7 K/UL — SIGNIFICANT CHANGE UP (ref 0–0.9)
MONOCYTES NFR BLD AUTO: 7 % — SIGNIFICANT CHANGE UP (ref 2–14)
NEUTROPHILS # BLD AUTO: 6.52 K/UL — SIGNIFICANT CHANGE UP (ref 1.8–7.4)
NEUTROPHILS NFR BLD AUTO: 65.5 % — SIGNIFICANT CHANGE UP (ref 43–77)
NITRITE UR-MCNC: NEGATIVE — SIGNIFICANT CHANGE UP
PCP SPEC-MCNC: SIGNIFICANT CHANGE UP
PH UR: 6 — SIGNIFICANT CHANGE UP (ref 5–8)
PLATELET # BLD AUTO: 212 K/UL — SIGNIFICANT CHANGE UP (ref 150–400)
POTASSIUM SERPL-MCNC: 3.9 MMOL/L — SIGNIFICANT CHANGE UP (ref 3.5–5.3)
POTASSIUM SERPL-SCNC: 3.9 MMOL/L — SIGNIFICANT CHANGE UP (ref 3.5–5.3)
PROT SERPL-MCNC: 6.7 GM/DL — SIGNIFICANT CHANGE UP (ref 6–8.3)
PROT UR-MCNC: NEGATIVE MG/DL — SIGNIFICANT CHANGE UP
RBC # BLD: 4.91 M/UL — SIGNIFICANT CHANGE UP (ref 4.2–5.8)
RBC # FLD: 13.6 % — SIGNIFICANT CHANGE UP (ref 10.3–14.5)
SALICYLATES SERPL-MCNC: 3.8 MG/DL — SIGNIFICANT CHANGE UP (ref 2.8–20)
SODIUM SERPL-SCNC: 140 MMOL/L — SIGNIFICANT CHANGE UP (ref 135–145)
SP GR SPEC: 1.01 — SIGNIFICANT CHANGE UP (ref 1.01–1.02)
TSH SERPL-MCNC: 0.92 UU/ML — SIGNIFICANT CHANGE UP (ref 0.34–4.82)
UROBILINOGEN FLD QL: NEGATIVE MG/DL — SIGNIFICANT CHANGE UP
WBC # BLD: 9.96 K/UL — SIGNIFICANT CHANGE UP (ref 3.8–10.5)
WBC # FLD AUTO: 9.96 K/UL — SIGNIFICANT CHANGE UP (ref 3.8–10.5)

## 2019-10-07 PROCEDURE — 84443 ASSAY THYROID STIM HORMONE: CPT

## 2019-10-07 PROCEDURE — 80164 ASSAY DIPROPYLACETIC ACD TOT: CPT

## 2019-10-07 PROCEDURE — 83036 HEMOGLOBIN GLYCOSYLATED A1C: CPT

## 2019-10-07 PROCEDURE — 73130 X-RAY EXAM OF HAND: CPT | Mod: 26,50

## 2019-10-07 PROCEDURE — 93005 ELECTROCARDIOGRAM TRACING: CPT

## 2019-10-07 PROCEDURE — 36415 COLL VENOUS BLD VENIPUNCTURE: CPT

## 2019-10-07 PROCEDURE — 80061 LIPID PANEL: CPT

## 2019-10-07 RX ORDER — NICOTINE POLACRILEX 2 MG
1 GUM BUCCAL DAILY
Refills: 0 | Status: DISCONTINUED | OUTPATIENT
Start: 2019-10-07 | End: 2019-10-08

## 2019-10-07 RX ADMIN — Medication 0.5 MILLIGRAM(S): at 22:10

## 2019-10-07 RX ADMIN — Medication 1 PATCH: at 22:10

## 2019-10-07 NOTE — ED BEHAVIORAL HEALTH ASSESSMENT NOTE - PAST PSYCHOTROPIC MEDICATION
risperdal; Depakote ER 1250 mg po hs  Prolixin 10 mg po q 12 hr  cogentin 1mg,  prolixin decanoate 100 mg q 4 weeks  lexapro 20 mg po q am
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
## 2019-10-07 NOTE — ED BEHAVIORAL HEALTH ASSESSMENT NOTE - DESCRIPTION
As per HPI     Vital Signs Last 24 Hrs  T(C): 36.8 (07 Oct 2019 10:41), Max: 36.8 (07 Oct 2019 10:41)  T(F): 98.3 (07 Oct 2019 10:41), Max: 98.3 (07 Oct 2019 10:41)  HR: 52 (07 Oct 2019 10:41) (52 - 52)  BP: 126/79 (07 Oct 2019 10:41) (126/79 - 126/79)  BP(mean): --  RR: 18 (07 Oct 2019 10:41) (18 - 18)  SpO2: 99% (07 Oct 2019 10:41) (99% - 99%) none single, never , has been living with mother and her boyfriend for the past 4 years

## 2019-10-07 NOTE — ED BEHAVIORAL HEALTH ASSESSMENT NOTE - OTHER PAST PSYCHIATRIC HISTORY (INCLUDE DETAILS REGARDING ONSET, COURSE OF ILLNESS, INPATIENT/OUTPATIENT TREATMENT)
Schizoaffective disorder (first break age 20) - chronic history of severe mental illness (primary) with comorbid substance abuse  History of [facility] admissions and stabilized by Dr. Kelly  History with treatment with Dr. Hines at [facility]  History of ACT team [facility] with Prolixin ISLAS

## 2019-10-07 NOTE — ED BEHAVIORAL HEALTH ASSESSMENT NOTE - VIOLENCE RISK FACTORS:
Violent ideation/threat/speech/Impulsivity/Substance abuse/Irritability/Affective dysregulation/Lack of insight into violence risk/need for treatment
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
## 2019-10-08 DIAGNOSIS — S01.01XS LACERATION WITHOUT FOREIGN BODY OF SCALP, SEQUELA: ICD-10-CM

## 2019-10-08 DIAGNOSIS — F23 BRIEF PSYCHOTIC DISORDER: ICD-10-CM

## 2019-10-08 DIAGNOSIS — F20.9 SCHIZOPHRENIA, UNSPECIFIED: ICD-10-CM

## 2019-10-08 LAB
HBA1C BLD-MCNC: 5 % — SIGNIFICANT CHANGE UP (ref 4–5.6)
TSH SERPL-MCNC: 0.51 UU/ML — SIGNIFICANT CHANGE UP (ref 0.34–4.82)

## 2019-10-08 PROCEDURE — 93010 ELECTROCARDIOGRAM REPORT: CPT

## 2019-10-08 RX ORDER — DIVALPROEX SODIUM 500 MG/1
250 TABLET, DELAYED RELEASE ORAL DAILY
Refills: 0 | Status: DISCONTINUED | OUTPATIENT
Start: 2019-10-08 | End: 2019-10-13

## 2019-10-08 RX ORDER — DIVALPROEX SODIUM 500 MG/1
250 TABLET, DELAYED RELEASE ORAL AT BEDTIME
Refills: 0 | Status: DISCONTINUED | OUTPATIENT
Start: 2019-10-08 | End: 2019-10-10

## 2019-10-08 RX ORDER — HALOPERIDOL DECANOATE 100 MG/ML
5 INJECTION INTRAMUSCULAR EVERY 6 HOURS
Refills: 0 | Status: DISCONTINUED | OUTPATIENT
Start: 2019-10-08 | End: 2019-10-13

## 2019-10-08 RX ORDER — DIPHENHYDRAMINE HCL 50 MG
50 CAPSULE ORAL EVERY 6 HOURS
Refills: 0 | Status: DISCONTINUED | OUTPATIENT
Start: 2019-10-08 | End: 2019-10-31

## 2019-10-08 RX ORDER — TRAZODONE HCL 50 MG
50 TABLET ORAL AT BEDTIME
Refills: 0 | Status: DISCONTINUED | OUTPATIENT
Start: 2019-10-08 | End: 2019-10-20

## 2019-10-08 RX ORDER — NICOTINE POLACRILEX 2 MG
2 GUM BUCCAL
Refills: 0 | Status: DISCONTINUED | OUTPATIENT
Start: 2019-10-08 | End: 2019-10-31

## 2019-10-08 RX ORDER — NICOTINE POLACRILEX 2 MG
1 GUM BUCCAL DAILY
Refills: 0 | Status: DISCONTINUED | OUTPATIENT
Start: 2019-10-08 | End: 2019-10-31

## 2019-10-08 RX ORDER — OLANZAPINE 15 MG/1
15 TABLET, FILM COATED ORAL AT BEDTIME
Refills: 0 | Status: DISCONTINUED | OUTPATIENT
Start: 2019-10-08 | End: 2019-10-10

## 2019-10-08 RX ADMIN — OLANZAPINE 15 MILLIGRAM(S): 15 TABLET, FILM COATED ORAL at 21:53

## 2019-10-08 RX ADMIN — DIVALPROEX SODIUM 250 MILLIGRAM(S): 500 TABLET, DELAYED RELEASE ORAL at 21:54

## 2019-10-08 RX ADMIN — Medication 1 PATCH: at 22:00

## 2019-10-08 RX ADMIN — Medication 2 MILLIGRAM(S): at 22:20

## 2019-10-08 RX ADMIN — Medication 50 MILLIGRAM(S): at 21:54

## 2019-10-08 NOTE — ED BEHAVIORAL HEALTH NOTE - BEHAVIORAL HEALTH NOTE
Writer discussed case with RN.  Patient was complaining of anxiety around 10pm and received ativan.  Since then, patient has been sleeping without issues or complaints.  Patient has not been agitated and has not expressed SI/HI.  Given hx of and risk for aggression, will not wake patient up at this time for reassessment.  Plan to continuing holding for inpatient psych bed.

## 2019-10-08 NOTE — H&P ADULT - ATTENDING COMMENTS
Case discussed and reviewed in detail with CAYLA Leija. Please note my plan below.    28 y/o M with PMH of schizophrenia, brief psychotic disorder, p/w     *Schizophrenia   -Management as per psych      *Laceration of scalp  -Surgery consult for staple removal   -Wound care    *Sinus bradycardia  -F/u outpatient with cardio for further evaluation - no symptoms reported     *DVT ppx  -Encourage ambulation
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
## 2019-10-08 NOTE — ED ADULT NURSE REASSESSMENT NOTE - STATUS
awaiting bed, no change
awaiting consult/Awaiting psych consult

## 2019-10-08 NOTE — ED BEHAVIORAL HEALTH NOTE - BEHAVIORAL HEALTH NOTE
PT is disheveled, irritable, psychotic, can not care for himself, needs inpatient treatement.   WE have bed openings on [facility], will admit 9.39 - Dr ANDREA Kelly.

## 2019-10-08 NOTE — ED ADULT NURSE REASSESSMENT NOTE - GENERAL PATIENT STATE
resting/sleeping
resting/sleeping
comfortable appearance/resting/sleeping
no change observed/comfortable appearance
no change observed/comfortable appearance
resting/sleeping
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
## 2019-10-08 NOTE — H&P ADULT - NSHPLABSRESULTS_GEN_ALL_CORE
14.5   9.96  )-----------( 212      ( 07 Oct 2019 11:22 )             43.7   10-07    140  |  106  |  9   ----------------------------<  90  3.9   |  28  |  1.05    Ca    8.3<L>      07 Oct 2019 11:22    TPro  6.7  /  Alb  3.7  /  TBili  0.5  /  DBili  x   /  AST  19  /  ALT  19  /  AlkPhos  96  10-07    EKG  Sinus Bradycardia 50/min   X-ray both hands and wrists negative for fracture or dislocation

## 2019-10-08 NOTE — ED ADULT NURSE REASSESSMENT NOTE - NS ED NURSE REASSESS COMMENT FT1
report received from previous rn. color good, skin warm and dry, respirations even and unlabored. patient sleeping at this time, in no acute distress. 1:1 maintained. patient safety maintained. will continue to monitor.
1:1 maintained. patient safety maintained. will continue to monitor.
1:1 maintained. patient safety maintained. will continue to monitor.
vitals stable, patient offers no complaints at this time. 1:1 maintained. patient safety maintained. will continue to monitor.
1:1, constant observation in progress

## 2019-10-08 NOTE — H&P ADULT - ASSESSMENT
29 YOWM with history of Schizophrenia presents with insomnia and feelings of aggression towards others. Although this was precipitated by recent personal conflicts ,he has been off his medications for over one year. Due to his feelings of aggression and reported homicidal ideation he will require inpatient psychiatric treatment to which he is voluntarily committed. 29 YOWM with history of Schizophrenia presents with insomnia and feelings of aggression towards others. Although this was precipitated by recent personal conflicts ,he has been off his medications for over one year. Due to his feelings of aggression and reported homicidal ideation, he will require inpatient psychiatric treatment to which he is voluntarily committed.

## 2019-10-08 NOTE — H&P ADULT - NSHPREVIEWOFSYSTEMS_GEN_ALL_CORE
REVIEW OF SYSTEMS  General:	feels better, slept well   Skin/Breast:  normal turgor  Ophthalmologic:  no diplopia, or blurry vision  ENMT:	no sore throat, epistaxis .ear pain or tinnitus or decreased hearing  Respiratory and Thorax:  no cough ,SOB or pleuritic pain.  Cardiovascular:	no chest pain, PN D or orthopnea   Gastrointestinal:	no melena , diarrhea weight loss   Ext  mild wrist pain at radial head bilaterally    WNL  Endo  no polyuria ,nocturia   Neuro  no seizures or syncope               Genitourinary:	    Musculoskeletal:	    Neurological:	    Psychiatric:	    Hematology/Lymphatics:	    Endocrine:	    Allergic/Immunologic: REVIEW OF SYSTEMS  General:	feels better, slept well   Skin/Breast:  normal turgor  Ophthalmologic:  no diplopia, or blurry vision  ENMT:	no sore throat, epistaxis .ear pain or tinnitus or decreased hearing  Respiratory and Thorax:  no cough ,SOB or pleuritic pain.  Cardiovascular:	no chest pain, PN D or orthopnea   Gastrointestinal:	no melena , diarrhea weight loss   Ext  mild wrist pain at radial head bilaterally    WNL  Endo  no polyuria ,nocturia   Neuro  no seizures or syncope

## 2019-10-08 NOTE — H&P ADULT - NSHPSOCIALHISTORY_GEN_ALL_CORE
unmarried  no children  High School graduate  Unemployed  worked in " sales"  Drinks 3-4 beers per day several days per week   Smokes Marijuana daily   Sexually active one partner   No opiate use

## 2019-10-08 NOTE — ED ADULT NURSE REASSESSMENT NOTE - COMFORT CARE
darkened lights/warm blanket provided
warm blanket provided/darkened lights
Ordered dinner tray and ate/meal provided

## 2019-10-08 NOTE — H&P ADULT - NSHPPHYSICALEXAM_GEN_ALL_CORE
Head normocephalic  Eyes  infraorbital ecchymosis. Eoms intact Perrla, No hyphema no diplopia   ENT   no blood septum is midline   Neck supple, no masses  Chest  linear excoriations across the left chest  10-12 cm ling running diagonally  Lungs clear   Heart RR&R 56/minute   Abdomen soft non tender , no ecchymosis , no organomegaly.  EXT  full rom , not andreas deformities   Neuro  CN ll-Xll grossly intact  Psych proper speech , dull affect , not actively hallucinating, relevant speech but evasive at times Head normocephalic  Eyes  infraorbital ecchymosis. Eoms intact Perrla @ 4mm, No hyphema no diplopia   ENT   no blood septum is midline   Neck supple, no masses  Chest  linear excoriations across the left chest  10-12 cm ling running diagonally  Lungs clear   Heart RR&R 56/minute   Abdomen soft non tender , no ecchymosis , no organomegaly.  EXT  full rom , not andreas deformities   Neuro  CN ll-Xll grossly intact  Psych proper speech , dull affect , not actively hallucinating, relevant speech but evasive at times Head normocephalic there is a 3cm laceration with dried blood and 4 staples on the right occipital region   Eyes  infraorbital ecchymosis. Eoms intact Perrla @ 4mm, No hyphema no diplopia   ENT   no blood septum is midline   Neck supple, no masses  Chest  linear excoriations across the left chest  10-12 cm ling running diagonally  Lungs clear   Heart RR&R 56/minute   Abdomen soft non tender , no ecchymosis , no organomegaly.  EXT  full rom , not andreas deformities   Neuro  CN ll-Xll grossly intact  Psych proper speech , dull affect , not actively hallucinating, relevant speech but evasive at times

## 2019-10-08 NOTE — H&P ADULT - NSICDXPASTSURGICALHX_GEN_ALL_CORE_FT
PAST SURGICAL HISTORY:  No significant past surgical history PAST SURGICAL HISTORY:  No significant past surgical history Lacertion of scalp

## 2019-10-09 PROCEDURE — 99223 1ST HOSP IP/OBS HIGH 75: CPT

## 2019-10-09 RX ADMIN — Medication 1 PATCH: at 09:22

## 2019-10-09 RX ADMIN — DIVALPROEX SODIUM 250 MILLIGRAM(S): 500 TABLET, DELAYED RELEASE ORAL at 21:21

## 2019-10-09 RX ADMIN — DIVALPROEX SODIUM 250 MILLIGRAM(S): 500 TABLET, DELAYED RELEASE ORAL at 09:22

## 2019-10-09 RX ADMIN — Medication 2 MILLIGRAM(S): at 21:21

## 2019-10-09 RX ADMIN — OLANZAPINE 15 MILLIGRAM(S): 15 TABLET, FILM COATED ORAL at 21:21

## 2019-10-09 NOTE — PROGRESS NOTE BEHAVIORAL HEALTH - NSBHADDHXMEDFT_PSY_A_CORE
benztropine 1 mg oral tablet: 1 tab(s) orally 2 times a day  · 	fluPHENAZine 10 mg oral tablet: 1 tab(s) orally 2 times a day  · 	Depakote 250 mg oral delayed release tablet: 1 tab(s) orally once a day  · 	Depakote 500 mg oral delayed release tablet: 1 tab(s) orally 2 times a day  · 	divalproex sodium 500 mg oral delayed release tablet: 1 tab(s) orally 2 times a day MDD:total depakote dose 1250 mg po q day ( mood disorder)  · 	divalproex sodium 250 mg oral delayed release tablet: 1 tab(s) orally once a day (at bedtime) MDD:total depakote dose 1250 mg po q day ( mood disorder)  · 	benztropine 1 mg oral tablet: 1 tab(s) orally 2 times a day MDD:total 2 mg po q day ( EPS prophylaxis)  · 	fluPHENAZine 10 mg oral tablet: 1 tab(s) orally 2 times a day    benztropine 1 mg oral tablet: 1 tab(s) orally 2 times a day  · 	fluPHENAZine 10 mg oral tablet: 1 tab(s) orally 2 times a day  · 	Depakote 250 mg oral delayed release tablet: 1 tab(s) orally once a day  · 	Depakote 500 mg oral delayed release tablet: 1 tab(s) orally 2 times a day  · 	divalproex sodium 500 mg oral delayed release tablet: 1 tab(s) orally 2 times a day MDD:total depakote dose 1250 mg po q day ( mood disorder)  · 	divalproex sodium 250 mg oral delayed release tablet: 1 tab(s) orally once a day (at bedtime) MDD:total depakote dose 1250 mg po q day ( mood disorder)  · 	benztropine 1 mg oral tablet: 1 tab(s) orally 2 times a day MDD:total 2 mg po q day ( EPS prophylaxis)  · 	fluPHENAZine 10 mg oral tablet: 1 tab(s) orally 2 times a day

## 2019-10-09 NOTE — PROGRESS NOTE BEHAVIORAL HEALTH - NSBHFUPINTERVALHXFT_PSY_A_CORE
Pt continues with hearing "voices" and is preoccupied with his own thoughts.    Py staying in his room most of the day.  Still with poor sleep. Pt continues with hearing "voices" and is preoccupied with his own thoughts.    Py staying in his room most of the day.  Still with poor sleep. disheveled  paranoid delusions; disorganized; homicidal ideations  preoccupied

## 2019-10-09 NOTE — PROGRESS NOTE BEHAVIORAL HEALTH - NSBHFUPIPCHARTREVFT_PSY_A_CORE
28 y/o male off his meds for 5-6 months.  he has had aggressive behavior and thoughts of wanting to harm others. h/o schizophrenia. smoking and drinking and on drugs until a few days agoPt has been off his meds for around a year and states he was told he could stop them.  he has had aggressive behavior and thoughts of wanting to harm others.  No SI but pt states he is homicidal

## 2019-10-09 NOTE — PROGRESS NOTE BEHAVIORAL HEALTH - NSBHADDHXPSYCHFT_PSY_A_CORE
hospitalized in [place] where he lives two days ago.  first break age 20), multiple prior in-patient hospitalizations (including HH

## 2019-10-09 NOTE — PROGRESS NOTE BEHAVIORAL HEALTH - VIOLENCE RISK FACTORS:
Impulsivity/Lack of insight into violence risk/need for treatment/Affective dysregulation/Violent ideation/threat/speech/Substance abuse/Irritability
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
## 2019-10-10 PROCEDURE — 99232 SBSQ HOSP IP/OBS MODERATE 35: CPT

## 2019-10-10 RX ORDER — FLUPHENAZINE HYDROCHLORIDE 1 MG/1
5 TABLET, FILM COATED ORAL
Refills: 0 | Status: DISCONTINUED | OUTPATIENT
Start: 2019-10-10 | End: 2019-10-10

## 2019-10-10 RX ORDER — HYDROXYZINE HCL 10 MG
50 TABLET ORAL EVERY 6 HOURS
Refills: 0 | Status: DISCONTINUED | OUTPATIENT
Start: 2019-10-10 | End: 2019-10-31

## 2019-10-10 RX ORDER — FLUPHENAZINE HYDROCHLORIDE 1 MG/1
5 TABLET, FILM COATED ORAL THREE TIMES A DAY
Refills: 0 | Status: DISCONTINUED | OUTPATIENT
Start: 2019-10-10 | End: 2019-10-12

## 2019-10-10 RX ORDER — NALTREXONE HYDROCHLORIDE 50 MG/1
50 TABLET, FILM COATED ORAL DAILY
Refills: 0 | Status: DISCONTINUED | OUTPATIENT
Start: 2019-10-10 | End: 2019-10-13

## 2019-10-10 RX ORDER — DIVALPROEX SODIUM 500 MG/1
500 TABLET, DELAYED RELEASE ORAL AT BEDTIME
Refills: 0 | Status: DISCONTINUED | OUTPATIENT
Start: 2019-10-10 | End: 2019-10-13

## 2019-10-10 RX ADMIN — FLUPHENAZINE HYDROCHLORIDE 5 MILLIGRAM(S): 1 TABLET, FILM COATED ORAL at 13:01

## 2019-10-10 RX ADMIN — DIVALPROEX SODIUM 500 MILLIGRAM(S): 500 TABLET, DELAYED RELEASE ORAL at 21:02

## 2019-10-10 RX ADMIN — DIVALPROEX SODIUM 250 MILLIGRAM(S): 500 TABLET, DELAYED RELEASE ORAL at 09:37

## 2019-10-10 RX ADMIN — NALTREXONE HYDROCHLORIDE 50 MILLIGRAM(S): 50 TABLET, FILM COATED ORAL at 15:44

## 2019-10-10 RX ADMIN — Medication 50 MILLIGRAM(S): at 21:03

## 2019-10-10 RX ADMIN — FLUPHENAZINE HYDROCHLORIDE 5 MILLIGRAM(S): 1 TABLET, FILM COATED ORAL at 21:02

## 2019-10-11 PROCEDURE — 99231 SBSQ HOSP IP/OBS SF/LOW 25: CPT

## 2019-10-11 RX ADMIN — DIVALPROEX SODIUM 250 MILLIGRAM(S): 500 TABLET, DELAYED RELEASE ORAL at 09:07

## 2019-10-11 RX ADMIN — FLUPHENAZINE HYDROCHLORIDE 5 MILLIGRAM(S): 1 TABLET, FILM COATED ORAL at 09:07

## 2019-10-11 RX ADMIN — Medication 50 MILLIGRAM(S): at 15:22

## 2019-10-11 RX ADMIN — FLUPHENAZINE HYDROCHLORIDE 5 MILLIGRAM(S): 1 TABLET, FILM COATED ORAL at 14:09

## 2019-10-11 RX ADMIN — Medication 1 PATCH: at 09:07

## 2019-10-11 RX ADMIN — Medication 1 PATCH: at 18:21

## 2019-10-11 RX ADMIN — Medication 50 MILLIGRAM(S): at 20:42

## 2019-10-11 RX ADMIN — FLUPHENAZINE HYDROCHLORIDE 5 MILLIGRAM(S): 1 TABLET, FILM COATED ORAL at 20:37

## 2019-10-11 RX ADMIN — DIVALPROEX SODIUM 500 MILLIGRAM(S): 500 TABLET, DELAYED RELEASE ORAL at 20:37

## 2019-10-11 RX ADMIN — NALTREXONE HYDROCHLORIDE 50 MILLIGRAM(S): 50 TABLET, FILM COATED ORAL at 09:07

## 2019-10-11 NOTE — PROGRESS NOTE BEHAVIORAL HEALTH - NSBHFUPINTERVALHXFT_PSY_A_CORE
Pt claiming that he is still with anxiety and "too many thoughts." Will need to continue with current dose as he was cc of some sedation.  Pt with willingness to continue with medication

## 2019-10-12 DIAGNOSIS — F25.9 SCHIZOAFFECTIVE DISORDER, UNSPECIFIED: ICD-10-CM

## 2019-10-12 DIAGNOSIS — Z91.19 PATIENT'S NONCOMPLIANCE WITH OTHER MEDICAL TREATMENT AND REGIMEN: ICD-10-CM

## 2019-10-12 PROCEDURE — 99232 SBSQ HOSP IP/OBS MODERATE 35: CPT

## 2019-10-12 RX ORDER — FLUPHENAZINE HYDROCHLORIDE 1 MG/1
10 TABLET, FILM COATED ORAL
Refills: 0 | Status: DISCONTINUED | OUTPATIENT
Start: 2019-10-12 | End: 2019-10-29

## 2019-10-12 RX ADMIN — Medication 1 PATCH: at 07:36

## 2019-10-12 RX ADMIN — FLUPHENAZINE HYDROCHLORIDE 5 MILLIGRAM(S): 1 TABLET, FILM COATED ORAL at 12:43

## 2019-10-12 RX ADMIN — Medication 2 MILLIGRAM(S): at 15:55

## 2019-10-12 RX ADMIN — DIVALPROEX SODIUM 500 MILLIGRAM(S): 500 TABLET, DELAYED RELEASE ORAL at 21:05

## 2019-10-12 RX ADMIN — Medication 50 MILLIGRAM(S): at 21:05

## 2019-10-12 RX ADMIN — FLUPHENAZINE HYDROCHLORIDE 10 MILLIGRAM(S): 1 TABLET, FILM COATED ORAL at 21:05

## 2019-10-12 RX ADMIN — Medication 1 PATCH: at 09:37

## 2019-10-12 RX ADMIN — FLUPHENAZINE HYDROCHLORIDE 5 MILLIGRAM(S): 1 TABLET, FILM COATED ORAL at 09:36

## 2019-10-12 RX ADMIN — DIVALPROEX SODIUM 250 MILLIGRAM(S): 500 TABLET, DELAYED RELEASE ORAL at 09:36

## 2019-10-12 RX ADMIN — Medication 2 MILLIGRAM(S): at 19:21

## 2019-10-12 RX ADMIN — NALTREXONE HYDROCHLORIDE 50 MILLIGRAM(S): 50 TABLET, FILM COATED ORAL at 09:37

## 2019-10-12 RX ADMIN — Medication 50 MILLIGRAM(S): at 21:07

## 2019-10-12 NOTE — PROGRESS NOTE BEHAVIORAL HEALTH - NSBHFUPINTERVALHXFT_PSY_A_CORE
Pt with continued preoccupation with own thoughts.  mostly staying by self .  Pt claiming continuation of voices.  will increase the prolixin to toalof 20mg daily .  Will be getting the valproic acid level also. Pt  continues to be guarded and suspicious .

## 2019-10-13 LAB
CHOLEST SERPL-MCNC: 107 MG/DL — SIGNIFICANT CHANGE UP (ref 10–199)
HDLC SERPL-MCNC: 29 MG/DL — LOW
LIPID PNL WITH DIRECT LDL SERPL: 56 MG/DL — SIGNIFICANT CHANGE UP
TOTAL CHOLESTEROL/HDL RATIO MEASUREMENT: 3.7 RATIO — SIGNIFICANT CHANGE UP (ref 3.4–9.6)
TRIGL SERPL-MCNC: 110 MG/DL — SIGNIFICANT CHANGE UP (ref 10–149)
VALPROATE SERPL-MCNC: 47 UG/ML — LOW (ref 50–100)

## 2019-10-13 PROCEDURE — 99232 SBSQ HOSP IP/OBS MODERATE 35: CPT

## 2019-10-13 RX ORDER — DIVALPROEX SODIUM 500 MG/1
500 TABLET, DELAYED RELEASE ORAL DAILY
Refills: 0 | Status: DISCONTINUED | OUTPATIENT
Start: 2019-10-13 | End: 2019-10-15

## 2019-10-13 RX ORDER — DIVALPROEX SODIUM 500 MG/1
750 TABLET, DELAYED RELEASE ORAL AT BEDTIME
Refills: 0 | Status: DISCONTINUED | OUTPATIENT
Start: 2019-10-13 | End: 2019-10-15

## 2019-10-13 RX ADMIN — Medication 1 PATCH: at 09:33

## 2019-10-13 RX ADMIN — FLUPHENAZINE HYDROCHLORIDE 10 MILLIGRAM(S): 1 TABLET, FILM COATED ORAL at 09:34

## 2019-10-13 RX ADMIN — Medication 1 PATCH: at 09:37

## 2019-10-13 RX ADMIN — NALTREXONE HYDROCHLORIDE 50 MILLIGRAM(S): 50 TABLET, FILM COATED ORAL at 09:34

## 2019-10-13 RX ADMIN — Medication 1 PATCH: at 07:33

## 2019-10-13 RX ADMIN — Medication 2 MILLIGRAM(S): at 17:56

## 2019-10-13 RX ADMIN — Medication 1 PATCH: at 21:42

## 2019-10-13 RX ADMIN — Medication 50 MILLIGRAM(S): at 20:59

## 2019-10-13 RX ADMIN — DIVALPROEX SODIUM 750 MILLIGRAM(S): 500 TABLET, DELAYED RELEASE ORAL at 20:59

## 2019-10-13 RX ADMIN — FLUPHENAZINE HYDROCHLORIDE 10 MILLIGRAM(S): 1 TABLET, FILM COATED ORAL at 20:59

## 2019-10-13 RX ADMIN — DIVALPROEX SODIUM 250 MILLIGRAM(S): 500 TABLET, DELAYED RELEASE ORAL at 09:34

## 2019-10-13 NOTE — PROGRESS NOTE BEHAVIORAL HEALTH - NSBHFUPINTERVALHXFT_PSY_A_CORE
Pt still with cc of auditory hallucination of voices.  Still with cc " too many thoughts in my head" In the past pt on Prolixin 10mg po bid and Depakote 1250mg , last level is 42.  Pt with the cc of feeling anxious and having excess anxiety and fatigue , will d/c the revia  Pt denies any suicidal ideation, intent  or plan

## 2019-10-14 DIAGNOSIS — F43.10 POST-TRAUMATIC STRESS DISORDER, UNSPECIFIED: ICD-10-CM

## 2019-10-14 PROCEDURE — 99232 SBSQ HOSP IP/OBS MODERATE 35: CPT

## 2019-10-14 PROCEDURE — 99024 POSTOP FOLLOW-UP VISIT: CPT

## 2019-10-14 RX ORDER — PRAZOSIN HCL 2 MG
1 CAPSULE ORAL AT BEDTIME
Refills: 0 | Status: DISCONTINUED | OUTPATIENT
Start: 2019-10-14 | End: 2019-10-31

## 2019-10-14 RX ADMIN — DIVALPROEX SODIUM 500 MILLIGRAM(S): 500 TABLET, DELAYED RELEASE ORAL at 09:26

## 2019-10-14 RX ADMIN — Medication 1 PATCH: at 09:27

## 2019-10-14 RX ADMIN — Medication 50 MILLIGRAM(S): at 21:16

## 2019-10-14 RX ADMIN — Medication 1 MILLIGRAM(S): at 21:17

## 2019-10-14 RX ADMIN — FLUPHENAZINE HYDROCHLORIDE 10 MILLIGRAM(S): 1 TABLET, FILM COATED ORAL at 21:17

## 2019-10-14 RX ADMIN — DIVALPROEX SODIUM 750 MILLIGRAM(S): 500 TABLET, DELAYED RELEASE ORAL at 21:17

## 2019-10-14 RX ADMIN — FLUPHENAZINE HYDROCHLORIDE 10 MILLIGRAM(S): 1 TABLET, FILM COATED ORAL at 09:26

## 2019-10-14 NOTE — PROGRESS NOTE BEHAVIORAL HEALTH - NSBHFUPINTERVALHXFT_PSY_A_CORE
Pt with the cc of not being able to sleep due to nightmares.    Will add on prazocin for nightmares  Pt otherwise is less insolated and is interactive with peers.  Pt not as isolated  and preoccupied.
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
## 2019-10-15 PROCEDURE — 99232 SBSQ HOSP IP/OBS MODERATE 35: CPT

## 2019-10-15 RX ORDER — DIVALPROEX SODIUM 500 MG/1
500 TABLET, DELAYED RELEASE ORAL DAILY
Refills: 0 | Status: DISCONTINUED | OUTPATIENT
Start: 2019-10-15 | End: 2019-10-31

## 2019-10-15 RX ORDER — DIVALPROEX SODIUM 500 MG/1
1000 TABLET, DELAYED RELEASE ORAL AT BEDTIME
Refills: 0 | Status: DISCONTINUED | OUTPATIENT
Start: 2019-10-15 | End: 2019-10-15

## 2019-10-15 RX ORDER — DIVALPROEX SODIUM 500 MG/1
1000 TABLET, DELAYED RELEASE ORAL AT BEDTIME
Refills: 0 | Status: DISCONTINUED | OUTPATIENT
Start: 2019-10-15 | End: 2019-10-31

## 2019-10-15 RX ADMIN — FLUPHENAZINE HYDROCHLORIDE 10 MILLIGRAM(S): 1 TABLET, FILM COATED ORAL at 09:34

## 2019-10-15 RX ADMIN — Medication 50 MILLIGRAM(S): at 20:54

## 2019-10-15 RX ADMIN — DIVALPROEX SODIUM 500 MILLIGRAM(S): 500 TABLET, DELAYED RELEASE ORAL at 09:34

## 2019-10-15 RX ADMIN — FLUPHENAZINE HYDROCHLORIDE 10 MILLIGRAM(S): 1 TABLET, FILM COATED ORAL at 20:54

## 2019-10-15 RX ADMIN — Medication 1 MILLIGRAM(S): at 20:54

## 2019-10-15 RX ADMIN — Medication 1 PATCH: at 09:37

## 2019-10-15 RX ADMIN — Medication 1 PATCH: at 09:35

## 2019-10-15 RX ADMIN — DIVALPROEX SODIUM 1000 MILLIGRAM(S): 500 TABLET, DELAYED RELEASE ORAL at 20:55

## 2019-10-15 NOTE — PROGRESS NOTE BEHAVIORAL HEALTH - NSBHFUPINTERVALHXFT_PSY_A_CORE
Pt with improving eye contact .  He is not preoccupied and is more interactive with peers.  He denies any side effects from medication  He is able to sleep

## 2019-10-16 PROCEDURE — 99231 SBSQ HOSP IP/OBS SF/LOW 25: CPT

## 2019-10-16 RX ORDER — FLUPHENAZINE HYDROCHLORIDE 1 MG/1
25 TABLET, FILM COATED ORAL ONCE
Refills: 0 | Status: COMPLETED | OUTPATIENT
Start: 2019-10-17 | End: 2019-10-17

## 2019-10-16 RX ADMIN — Medication 50 MILLIGRAM(S): at 21:00

## 2019-10-16 RX ADMIN — Medication 1 MILLIGRAM(S): at 21:00

## 2019-10-16 RX ADMIN — DIVALPROEX SODIUM 1000 MILLIGRAM(S): 500 TABLET, DELAYED RELEASE ORAL at 21:00

## 2019-10-16 RX ADMIN — Medication 1 PATCH: at 09:47

## 2019-10-16 RX ADMIN — DIVALPROEX SODIUM 500 MILLIGRAM(S): 500 TABLET, DELAYED RELEASE ORAL at 09:47

## 2019-10-16 RX ADMIN — FLUPHENAZINE HYDROCHLORIDE 10 MILLIGRAM(S): 1 TABLET, FILM COATED ORAL at 09:47

## 2019-10-16 RX ADMIN — FLUPHENAZINE HYDROCHLORIDE 10 MILLIGRAM(S): 1 TABLET, FILM COATED ORAL at 21:00

## 2019-10-16 NOTE — PROGRESS NOTE BEHAVIORAL HEALTH - NSBHFUPINTERVALHXFT_PSY_A_CORE
Pt with hx of noncompliance and of aggressive behavior. Pt may be homeless.   Pt needing to be on the prolixin decanote which he was on in the past.  Pt to address alcohol use. May need rehab  and ACT involvement.

## 2019-10-17 LAB — VALPROATE SERPL-MCNC: 109 UG/ML — HIGH (ref 50–100)

## 2019-10-17 PROCEDURE — 99232 SBSQ HOSP IP/OBS MODERATE 35: CPT

## 2019-10-17 RX ADMIN — FLUPHENAZINE HYDROCHLORIDE 10 MILLIGRAM(S): 1 TABLET, FILM COATED ORAL at 20:52

## 2019-10-17 RX ADMIN — Medication 1 PATCH: at 09:11

## 2019-10-17 RX ADMIN — Medication 1 MILLIGRAM(S): at 20:52

## 2019-10-17 RX ADMIN — DIVALPROEX SODIUM 500 MILLIGRAM(S): 500 TABLET, DELAYED RELEASE ORAL at 09:11

## 2019-10-17 RX ADMIN — Medication 1 PATCH: at 21:52

## 2019-10-17 RX ADMIN — DIVALPROEX SODIUM 1000 MILLIGRAM(S): 500 TABLET, DELAYED RELEASE ORAL at 20:52

## 2019-10-17 RX ADMIN — FLUPHENAZINE HYDROCHLORIDE 10 MILLIGRAM(S): 1 TABLET, FILM COATED ORAL at 09:11

## 2019-10-17 RX ADMIN — FLUPHENAZINE HYDROCHLORIDE 25 MILLIGRAM(S): 1 TABLET, FILM COATED ORAL at 13:56

## 2019-10-17 NOTE — PROGRESS NOTE BEHAVIORAL HEALTH - NSBHADMITMEDEDUDETAILS_A_CORE FT
Pt was educated about the use of medication and of the potential side effects, and of the need to inform his Provider of any presence of any side effects of the medications .

## 2019-10-17 NOTE — PROGRESS NOTE BEHAVIORAL HEALTH - NSBHFUPINTERVALHXFT_PSY_A_CORE
PT states that he si interested in gong to rehab. He takes his meds, Denies SI, HI, ah, vh. PI.   MEDICATIONS  (STANDING):  diVALproex DR 1000 milliGRAM(s) Oral at bedtime  diVALproex  milliGRAM(s) Oral daily  fluPHENAZine 10 milliGRAM(s) Oral two times a day  nicotine - 21 mG/24Hr(s) Patch 1 Patch Transdermal daily  prazosin. 1 milliGRAM(s) Oral at bedtime    MEDICATIONS  (PRN):  diphenhydrAMINE   Injectable 50 milliGRAM(s) IntraMuscular every 6 hours PRN Agitation  hydrOXYzine hydrochloride 50 milliGRAM(s) Oral every 6 hours PRN anxiety  nicotine  Polacrilex Gum 2 milliGRAM(s) Oral every 2 hours PRN breakthrough cravings  traZODone 50 milliGRAM(s) Oral at bedtime PRN insomnia

## 2019-10-18 DIAGNOSIS — F25.0 SCHIZOAFFECTIVE DISORDER, BIPOLAR TYPE: ICD-10-CM

## 2019-10-18 DIAGNOSIS — F19.10 OTHER PSYCHOACTIVE SUBSTANCE ABUSE, UNCOMPLICATED: ICD-10-CM

## 2019-10-18 LAB — VALPROATE SERPL-MCNC: 96 UG/ML — SIGNIFICANT CHANGE UP (ref 50–100)

## 2019-10-18 PROCEDURE — 99232 SBSQ HOSP IP/OBS MODERATE 35: CPT

## 2019-10-18 RX ADMIN — Medication 50 MILLIGRAM(S): at 23:15

## 2019-10-18 RX ADMIN — Medication 50 MILLIGRAM(S): at 14:09

## 2019-10-18 RX ADMIN — DIVALPROEX SODIUM 1000 MILLIGRAM(S): 500 TABLET, DELAYED RELEASE ORAL at 20:54

## 2019-10-18 RX ADMIN — Medication 1 MILLIGRAM(S): at 20:55

## 2019-10-18 RX ADMIN — DIVALPROEX SODIUM 500 MILLIGRAM(S): 500 TABLET, DELAYED RELEASE ORAL at 09:07

## 2019-10-18 RX ADMIN — FLUPHENAZINE HYDROCHLORIDE 10 MILLIGRAM(S): 1 TABLET, FILM COATED ORAL at 09:07

## 2019-10-18 RX ADMIN — FLUPHENAZINE HYDROCHLORIDE 10 MILLIGRAM(S): 1 TABLET, FILM COATED ORAL at 20:55

## 2019-10-18 NOTE — PROGRESS NOTE BEHAVIORAL HEALTH - NSBHFUPINTERVALHXFT_PSY_A_CORE
Pt si in his bed, head covered with villatoro. Needs promoting to communicate, denies depressed mood, SI, HI, AH,VH. he states taet he sees "demonic pictures: in dreams.   NO agitation. PT si adherent to treatment, received Prolixin dec yesterday. Takes depakote, level is 96 this AM.

## 2019-10-19 RX ORDER — ACETAMINOPHEN 500 MG
650 TABLET ORAL EVERY 6 HOURS
Refills: 0 | Status: DISCONTINUED | OUTPATIENT
Start: 2019-10-19 | End: 2019-10-31

## 2019-10-19 RX ORDER — ACETAMINOPHEN 500 MG
650 TABLET ORAL ONCE
Refills: 0 | Status: COMPLETED | OUTPATIENT
Start: 2019-10-19 | End: 2019-10-19

## 2019-10-19 RX ORDER — DIPHENHYDRAMINE HCL 50 MG
50 CAPSULE ORAL AT BEDTIME
Refills: 0 | Status: DISCONTINUED | OUTPATIENT
Start: 2019-10-19 | End: 2019-10-31

## 2019-10-19 RX ORDER — LANOLIN ALCOHOL/MO/W.PET/CERES
5 CREAM (GRAM) TOPICAL AT BEDTIME
Refills: 0 | Status: DISCONTINUED | OUTPATIENT
Start: 2019-10-19 | End: 2019-10-31

## 2019-10-19 RX ADMIN — DIVALPROEX SODIUM 500 MILLIGRAM(S): 500 TABLET, DELAYED RELEASE ORAL at 09:26

## 2019-10-19 RX ADMIN — Medication 50 MILLIGRAM(S): at 00:41

## 2019-10-19 RX ADMIN — Medication 650 MILLIGRAM(S): at 12:07

## 2019-10-19 RX ADMIN — Medication 50 MILLIGRAM(S): at 21:20

## 2019-10-19 RX ADMIN — Medication 650 MILLIGRAM(S): at 11:03

## 2019-10-19 RX ADMIN — FLUPHENAZINE HYDROCHLORIDE 10 MILLIGRAM(S): 1 TABLET, FILM COATED ORAL at 21:20

## 2019-10-19 RX ADMIN — Medication 650 MILLIGRAM(S): at 11:25

## 2019-10-19 RX ADMIN — Medication 5 MILLIGRAM(S): at 21:20

## 2019-10-19 RX ADMIN — DIVALPROEX SODIUM 1000 MILLIGRAM(S): 500 TABLET, DELAYED RELEASE ORAL at 21:20

## 2019-10-19 RX ADMIN — FLUPHENAZINE HYDROCHLORIDE 10 MILLIGRAM(S): 1 TABLET, FILM COATED ORAL at 09:26

## 2019-10-20 RX ORDER — TRAZODONE HCL 50 MG
100 TABLET ORAL AT BEDTIME
Refills: 0 | Status: DISCONTINUED | OUTPATIENT
Start: 2019-10-20 | End: 2019-10-31

## 2019-10-20 RX ADMIN — DIVALPROEX SODIUM 500 MILLIGRAM(S): 500 TABLET, DELAYED RELEASE ORAL at 09:09

## 2019-10-20 RX ADMIN — Medication 50 MILLIGRAM(S): at 20:55

## 2019-10-20 RX ADMIN — DIVALPROEX SODIUM 1000 MILLIGRAM(S): 500 TABLET, DELAYED RELEASE ORAL at 20:52

## 2019-10-20 RX ADMIN — Medication 1 MILLIGRAM(S): at 20:52

## 2019-10-20 RX ADMIN — FLUPHENAZINE HYDROCHLORIDE 10 MILLIGRAM(S): 1 TABLET, FILM COATED ORAL at 09:09

## 2019-10-20 RX ADMIN — Medication 5 MILLIGRAM(S): at 20:52

## 2019-10-20 RX ADMIN — FLUPHENAZINE HYDROCHLORIDE 10 MILLIGRAM(S): 1 TABLET, FILM COATED ORAL at 20:52

## 2019-10-20 RX ADMIN — Medication 100 MILLIGRAM(S): at 20:54

## 2019-10-20 RX ADMIN — Medication 2 MILLIGRAM(S): at 12:53

## 2019-10-20 RX ADMIN — Medication 2 MILLIGRAM(S): at 17:55

## 2019-10-21 PROCEDURE — 99232 SBSQ HOSP IP/OBS MODERATE 35: CPT

## 2019-10-21 RX ORDER — TUBERCULIN PURIFIED PROTEIN DERIVATIVE 5 [IU]/.1ML
5 INJECTION, SOLUTION INTRADERMAL ONCE
Refills: 0 | Status: COMPLETED | OUTPATIENT
Start: 2019-10-21 | End: 2019-10-22

## 2019-10-21 RX ADMIN — Medication 50 MILLIGRAM(S): at 20:38

## 2019-10-21 RX ADMIN — DIVALPROEX SODIUM 1000 MILLIGRAM(S): 500 TABLET, DELAYED RELEASE ORAL at 20:36

## 2019-10-21 RX ADMIN — FLUPHENAZINE HYDROCHLORIDE 10 MILLIGRAM(S): 1 TABLET, FILM COATED ORAL at 20:36

## 2019-10-21 RX ADMIN — Medication 50 MILLIGRAM(S): at 18:25

## 2019-10-21 RX ADMIN — Medication 5 MILLIGRAM(S): at 20:36

## 2019-10-21 RX ADMIN — DIVALPROEX SODIUM 500 MILLIGRAM(S): 500 TABLET, DELAYED RELEASE ORAL at 09:09

## 2019-10-21 RX ADMIN — FLUPHENAZINE HYDROCHLORIDE 10 MILLIGRAM(S): 1 TABLET, FILM COATED ORAL at 09:10

## 2019-10-21 RX ADMIN — Medication 1 MILLIGRAM(S): at 20:36

## 2019-10-21 NOTE — PROGRESS NOTE BEHAVIORAL HEALTH - NSBHFUPINTERVALHXFT_PSY_A_CORE
Pt is in his bed, upon awakening became bright and talkative.  Met with him in his room and later during the visiting hours with his mother and him in day room. PT is more spontaneous, Open to the plan to got to rehab. His mother agrees.  Denies depressed mood, SI, HI, AH, VH.   he states that eh had no VH over the weekend.   NO agitation. PT is adherent to treatment, received Prolixin dec on 10/17/19. Takes depakote, level is 96 on 10/18/19.

## 2019-10-22 PROCEDURE — 99232 SBSQ HOSP IP/OBS MODERATE 35: CPT

## 2019-10-22 RX ADMIN — FLUPHENAZINE HYDROCHLORIDE 10 MILLIGRAM(S): 1 TABLET, FILM COATED ORAL at 21:17

## 2019-10-22 RX ADMIN — TUBERCULIN PURIFIED PROTEIN DERIVATIVE 5 UNIT(S): 5 INJECTION, SOLUTION INTRADERMAL at 10:47

## 2019-10-22 RX ADMIN — Medication 1 MILLIGRAM(S): at 21:16

## 2019-10-22 RX ADMIN — DIVALPROEX SODIUM 500 MILLIGRAM(S): 500 TABLET, DELAYED RELEASE ORAL at 09:17

## 2019-10-22 RX ADMIN — Medication 5 MILLIGRAM(S): at 21:17

## 2019-10-22 RX ADMIN — Medication 100 MILLIGRAM(S): at 21:17

## 2019-10-22 RX ADMIN — FLUPHENAZINE HYDROCHLORIDE 10 MILLIGRAM(S): 1 TABLET, FILM COATED ORAL at 09:17

## 2019-10-22 RX ADMIN — Medication 50 MILLIGRAM(S): at 12:25

## 2019-10-22 RX ADMIN — DIVALPROEX SODIUM 1000 MILLIGRAM(S): 500 TABLET, DELAYED RELEASE ORAL at 21:17

## 2019-10-22 NOTE — PROGRESS NOTE BEHAVIORAL HEALTH - NSBHFUPINTERVALHXFT_PSY_A_CORE
Pt is in his bed,  bright and talkative.   Pt is more spontaneous, Open to the plan to got to rehab. His mother agrees.  Denies depressed mood, SI, HI, AH, VH. He states that eh had no VH over the weekend.   No agitation. PT is adherent to treatment, received Prolixin dec on 10/17/19. Takes depakote, level is 96 on 10/18/19.  PT is symptomatically  stable for transfer to rehab,  waiting for CSW to complete referral process.

## 2019-10-23 PROCEDURE — 99232 SBSQ HOSP IP/OBS MODERATE 35: CPT

## 2019-10-23 RX ADMIN — DIVALPROEX SODIUM 500 MILLIGRAM(S): 500 TABLET, DELAYED RELEASE ORAL at 08:55

## 2019-10-23 RX ADMIN — FLUPHENAZINE HYDROCHLORIDE 10 MILLIGRAM(S): 1 TABLET, FILM COATED ORAL at 08:55

## 2019-10-23 RX ADMIN — Medication 100 MILLIGRAM(S): at 21:15

## 2019-10-23 RX ADMIN — DIVALPROEX SODIUM 1000 MILLIGRAM(S): 500 TABLET, DELAYED RELEASE ORAL at 21:14

## 2019-10-23 RX ADMIN — Medication 50 MILLIGRAM(S): at 12:24

## 2019-10-23 RX ADMIN — Medication 5 MILLIGRAM(S): at 21:15

## 2019-10-23 RX ADMIN — FLUPHENAZINE HYDROCHLORIDE 10 MILLIGRAM(S): 1 TABLET, FILM COATED ORAL at 21:14

## 2019-10-23 RX ADMIN — Medication 50 MILLIGRAM(S): at 19:11

## 2019-10-23 NOTE — PROGRESS NOTE BEHAVIORAL HEALTH - NSBHFUPINTERVALHXFT_PSY_A_CORE
Pt is visible on the unit, unshaved, bright and talkative, spontaneous. Denies depressed mood, SI, HI, AH, VH. He states that he had no AH or VH.   No agitation. PT is adherent to treatment, received Prolixin dec on given 10/17/19. next one due on 10/31/19. Takes Depakote, level is 96 on 10/18/19.  PT is symptomatically  stable for D/C.

## 2019-10-24 PROCEDURE — 99232 SBSQ HOSP IP/OBS MODERATE 35: CPT

## 2019-10-24 RX ADMIN — FLUPHENAZINE HYDROCHLORIDE 10 MILLIGRAM(S): 1 TABLET, FILM COATED ORAL at 21:21

## 2019-10-24 RX ADMIN — Medication 50 MILLIGRAM(S): at 09:56

## 2019-10-24 RX ADMIN — Medication 1 MILLIGRAM(S): at 21:21

## 2019-10-24 RX ADMIN — Medication 100 MILLIGRAM(S): at 21:21

## 2019-10-24 RX ADMIN — DIVALPROEX SODIUM 500 MILLIGRAM(S): 500 TABLET, DELAYED RELEASE ORAL at 09:56

## 2019-10-24 RX ADMIN — DIVALPROEX SODIUM 1000 MILLIGRAM(S): 500 TABLET, DELAYED RELEASE ORAL at 21:20

## 2019-10-24 RX ADMIN — TUBERCULIN PURIFIED PROTEIN DERIVATIVE 5 UNIT(S): 5 INJECTION, SOLUTION INTRADERMAL at 11:01

## 2019-10-24 RX ADMIN — FLUPHENAZINE HYDROCHLORIDE 10 MILLIGRAM(S): 1 TABLET, FILM COATED ORAL at 09:56

## 2019-10-24 RX ADMIN — Medication 5 MILLIGRAM(S): at 21:21

## 2019-10-24 NOTE — PROGRESS NOTE BEHAVIORAL HEALTH - NSBHFUPINTERVALHXFT_PSY_A_CORE
No significant changes in cl presentation in comparisons to yesterday.       Pt is visible, unshaved, but bright and talkative, spontaneous. Denies depressed mood, denies anxiety, stable no diurnal variations. SI, HI, AH, VH. No agitation or agression. PT is adherent to treatment, received Prolixin dec on given 10/17/19. next one due on 10/31/19. Takes Depakote, level is 96 on 10/18/19.  PT is symptomatically  stable for D/C.

## 2019-10-25 PROCEDURE — 99232 SBSQ HOSP IP/OBS MODERATE 35: CPT

## 2019-10-25 RX ADMIN — FLUPHENAZINE HYDROCHLORIDE 10 MILLIGRAM(S): 1 TABLET, FILM COATED ORAL at 09:28

## 2019-10-25 RX ADMIN — DIVALPROEX SODIUM 500 MILLIGRAM(S): 500 TABLET, DELAYED RELEASE ORAL at 09:28

## 2019-10-25 RX ADMIN — Medication 100 MILLIGRAM(S): at 20:34

## 2019-10-25 RX ADMIN — Medication 50 MILLIGRAM(S): at 17:32

## 2019-10-25 RX ADMIN — DIVALPROEX SODIUM 1000 MILLIGRAM(S): 500 TABLET, DELAYED RELEASE ORAL at 20:34

## 2019-10-25 RX ADMIN — Medication 5 MILLIGRAM(S): at 20:34

## 2019-10-25 RX ADMIN — FLUPHENAZINE HYDROCHLORIDE 10 MILLIGRAM(S): 1 TABLET, FILM COATED ORAL at 20:34

## 2019-10-25 RX ADMIN — Medication 2 MILLIGRAM(S): at 20:36

## 2019-10-25 RX ADMIN — Medication 1 MILLIGRAM(S): at 20:34

## 2019-10-25 NOTE — PROGRESS NOTE BEHAVIORAL HEALTH - NSBHFUPINTERVALHXFT_PSY_A_CORE
No significant changes in cl presentation in comparisons to yesterday. PT states it is boring here and he would like to be d./earnest.   Pt is visible, unshaved as baseline,  bright, talkative, spontaneous. Denies depressed mood, denies anxiety, stable mood, no diurnal variations. Denies SI, HI, AH, VH. No agitation or agression. PT is adherent to treatment, received Prolixin dec on given 10/17/19. next one due on 10/31/19. Takes Depakote, level is 96 on 10/18/19.  PT is symptomatically  stable for D/C.  Awaiting CSW connection community and housing.   Planing to d/c mid next week.

## 2019-10-26 RX ADMIN — Medication 2 MILLIGRAM(S): at 16:19

## 2019-10-26 RX ADMIN — FLUPHENAZINE HYDROCHLORIDE 10 MILLIGRAM(S): 1 TABLET, FILM COATED ORAL at 09:30

## 2019-10-26 RX ADMIN — DIVALPROEX SODIUM 500 MILLIGRAM(S): 500 TABLET, DELAYED RELEASE ORAL at 09:30

## 2019-10-26 RX ADMIN — Medication 100 MILLIGRAM(S): at 20:29

## 2019-10-26 RX ADMIN — Medication 1 MILLIGRAM(S): at 20:28

## 2019-10-26 RX ADMIN — Medication 50 MILLIGRAM(S): at 16:18

## 2019-10-26 RX ADMIN — DIVALPROEX SODIUM 1000 MILLIGRAM(S): 500 TABLET, DELAYED RELEASE ORAL at 20:29

## 2019-10-26 RX ADMIN — FLUPHENAZINE HYDROCHLORIDE 10 MILLIGRAM(S): 1 TABLET, FILM COATED ORAL at 20:28

## 2019-10-26 RX ADMIN — Medication 5 MILLIGRAM(S): at 20:28

## 2019-10-27 RX ADMIN — DIVALPROEX SODIUM 1000 MILLIGRAM(S): 500 TABLET, DELAYED RELEASE ORAL at 20:38

## 2019-10-27 RX ADMIN — Medication 5 MILLIGRAM(S): at 20:38

## 2019-10-27 RX ADMIN — FLUPHENAZINE HYDROCHLORIDE 10 MILLIGRAM(S): 1 TABLET, FILM COATED ORAL at 09:43

## 2019-10-27 RX ADMIN — DIVALPROEX SODIUM 500 MILLIGRAM(S): 500 TABLET, DELAYED RELEASE ORAL at 09:43

## 2019-10-27 RX ADMIN — FLUPHENAZINE HYDROCHLORIDE 10 MILLIGRAM(S): 1 TABLET, FILM COATED ORAL at 20:38

## 2019-10-28 RX ADMIN — Medication 2 MILLIGRAM(S): at 17:42

## 2019-10-28 RX ADMIN — Medication 100 MILLIGRAM(S): at 20:44

## 2019-10-28 RX ADMIN — Medication 2 MILLIGRAM(S): at 12:33

## 2019-10-28 RX ADMIN — Medication 5 MILLIGRAM(S): at 20:45

## 2019-10-28 RX ADMIN — FLUPHENAZINE HYDROCHLORIDE 10 MILLIGRAM(S): 1 TABLET, FILM COATED ORAL at 20:45

## 2019-10-28 RX ADMIN — DIVALPROEX SODIUM 1000 MILLIGRAM(S): 500 TABLET, DELAYED RELEASE ORAL at 20:45

## 2019-10-28 RX ADMIN — FLUPHENAZINE HYDROCHLORIDE 10 MILLIGRAM(S): 1 TABLET, FILM COATED ORAL at 09:17

## 2019-10-28 RX ADMIN — Medication 1 MILLIGRAM(S): at 20:43

## 2019-10-28 RX ADMIN — DIVALPROEX SODIUM 500 MILLIGRAM(S): 500 TABLET, DELAYED RELEASE ORAL at 09:17

## 2019-10-29 RX ORDER — FLUPHENAZINE HYDROCHLORIDE 1 MG/1
2.5 TABLET, FILM COATED ORAL
Refills: 0 | Status: DISCONTINUED | OUTPATIENT
Start: 2019-10-29 | End: 2019-10-31

## 2019-10-29 RX ORDER — FLUPHENAZINE HYDROCHLORIDE 1 MG/1
10 TABLET, FILM COATED ORAL DAILY
Refills: 0 | Status: DISCONTINUED | OUTPATIENT
Start: 2019-10-29 | End: 2019-10-31

## 2019-10-29 RX ORDER — FLUPHENAZINE HYDROCHLORIDE 1 MG/1
10 TABLET, FILM COATED ORAL AT BEDTIME
Refills: 0 | Status: DISCONTINUED | OUTPATIENT
Start: 2019-10-29 | End: 2019-10-31

## 2019-10-29 RX ORDER — FLUPHENAZINE HYDROCHLORIDE 1 MG/1
50 TABLET, FILM COATED ORAL ONCE
Refills: 0 | Status: COMPLETED | OUTPATIENT
Start: 2019-10-31 | End: 2019-10-31

## 2019-10-29 RX ADMIN — FLUPHENAZINE HYDROCHLORIDE 10 MILLIGRAM(S): 1 TABLET, FILM COATED ORAL at 09:25

## 2019-10-29 RX ADMIN — FLUPHENAZINE HYDROCHLORIDE 10 MILLIGRAM(S): 1 TABLET, FILM COATED ORAL at 21:06

## 2019-10-29 RX ADMIN — Medication 1 MILLIGRAM(S): at 21:06

## 2019-10-29 RX ADMIN — DIVALPROEX SODIUM 500 MILLIGRAM(S): 500 TABLET, DELAYED RELEASE ORAL at 09:25

## 2019-10-29 RX ADMIN — Medication 5 MILLIGRAM(S): at 21:07

## 2019-10-29 RX ADMIN — Medication 2 MILLIGRAM(S): at 12:18

## 2019-10-29 RX ADMIN — DIVALPROEX SODIUM 1000 MILLIGRAM(S): 500 TABLET, DELAYED RELEASE ORAL at 21:05

## 2019-10-30 PROCEDURE — 99232 SBSQ HOSP IP/OBS MODERATE 35: CPT

## 2019-10-30 RX ADMIN — DIVALPROEX SODIUM 1000 MILLIGRAM(S): 500 TABLET, DELAYED RELEASE ORAL at 21:12

## 2019-10-30 RX ADMIN — Medication 5 MILLIGRAM(S): at 21:11

## 2019-10-30 RX ADMIN — FLUPHENAZINE HYDROCHLORIDE 2.5 MILLIGRAM(S): 1 TABLET, FILM COATED ORAL at 21:14

## 2019-10-30 RX ADMIN — Medication 50 MILLIGRAM(S): at 21:11

## 2019-10-30 RX ADMIN — FLUPHENAZINE HYDROCHLORIDE 2.5 MILLIGRAM(S): 1 TABLET, FILM COATED ORAL at 09:21

## 2019-10-30 RX ADMIN — Medication 2 MILLIGRAM(S): at 13:09

## 2019-10-30 RX ADMIN — DIVALPROEX SODIUM 500 MILLIGRAM(S): 500 TABLET, DELAYED RELEASE ORAL at 09:21

## 2019-10-30 RX ADMIN — Medication 50 MILLIGRAM(S): at 13:09

## 2019-10-30 RX ADMIN — FLUPHENAZINE HYDROCHLORIDE 10 MILLIGRAM(S): 1 TABLET, FILM COATED ORAL at 21:11

## 2019-10-30 RX ADMIN — FLUPHENAZINE HYDROCHLORIDE 10 MILLIGRAM(S): 1 TABLET, FILM COATED ORAL at 09:21

## 2019-10-30 RX ADMIN — Medication 1 MILLIGRAM(S): at 21:12

## 2019-10-30 NOTE — PROGRESS NOTE BEHAVIORAL HEALTH - NSBHFUPINTERVALHXFT_PSY_A_CORE
Pt is aware of the fact the his application for a place to live is still pending. Pt denies any suicidal ideation or plan.  Pt denies any hallucination .  Speech is goal directed .  no delusions elicited Pt with denial of any side effects from medication . .  Pt claims he may have some friends that he could stay with , he is aware that his family would not will not take him in.   Pt has been compliant with medication here .  He is aware of the  need for him to refrain from any substance use and to continue with his medication and follow up. .

## 2019-10-31 VITALS — TEMPERATURE: 98 F | RESPIRATION RATE: 16 BRPM | OXYGEN SATURATION: 99 %

## 2019-10-31 PROCEDURE — 99232 SBSQ HOSP IP/OBS MODERATE 35: CPT

## 2019-10-31 RX ORDER — FLUPHENAZINE HYDROCHLORIDE 1 MG/1
1 TABLET, FILM COATED ORAL
Qty: 15 | Refills: 1
Start: 2019-10-31 | End: 2019-11-29

## 2019-10-31 RX ORDER — NICOTINE POLACRILEX 2 MG
1 GUM BUCCAL
Qty: 60 | Refills: 1
Start: 2019-10-31 | End: 2019-11-29

## 2019-10-31 RX ORDER — FLUPHENAZINE HYDROCHLORIDE 1 MG/1
0 TABLET, FILM COATED ORAL
Qty: 0 | Refills: 0 | DISCHARGE
Start: 2019-10-31 | End: 2019-11-14

## 2019-10-31 RX ORDER — TRAZODONE HCL 50 MG
1 TABLET ORAL
Qty: 15 | Refills: 1
Start: 2019-10-31 | End: 2019-11-29

## 2019-10-31 RX ORDER — BENZTROPINE MESYLATE 1 MG
1 TABLET ORAL
Qty: 30 | Refills: 1
Start: 2019-10-31 | End: 2019-11-29

## 2019-10-31 RX ORDER — DIVALPROEX SODIUM 500 MG/1
3 TABLET, DELAYED RELEASE ORAL
Qty: 45 | Refills: 1
Start: 2019-10-31 | End: 2019-11-29

## 2019-10-31 RX ORDER — PRAZOSIN HCL 2 MG
1 CAPSULE ORAL
Qty: 15 | Refills: 1
Start: 2019-10-31 | End: 2019-11-29

## 2019-10-31 RX ORDER — NICOTINE POLACRILEX 2 MG
1 GUM BUCCAL
Qty: 15 | Refills: 1
Start: 2019-10-31 | End: 2019-11-29

## 2019-10-31 RX ORDER — LANOLIN ALCOHOL/MO/W.PET/CERES
1 CREAM (GRAM) TOPICAL
Qty: 15 | Refills: 1
Start: 2019-10-31 | End: 2019-11-29

## 2019-10-31 RX ADMIN — FLUPHENAZINE HYDROCHLORIDE 10 MILLIGRAM(S): 1 TABLET, FILM COATED ORAL at 09:10

## 2019-10-31 RX ADMIN — DIVALPROEX SODIUM 500 MILLIGRAM(S): 500 TABLET, DELAYED RELEASE ORAL at 09:10

## 2019-10-31 RX ADMIN — FLUPHENAZINE HYDROCHLORIDE 50 MILLIGRAM(S): 1 TABLET, FILM COATED ORAL at 13:31

## 2019-10-31 RX ADMIN — FLUPHENAZINE HYDROCHLORIDE 2.5 MILLIGRAM(S): 1 TABLET, FILM COATED ORAL at 09:10

## 2019-10-31 NOTE — PROGRESS NOTE BEHAVIORAL HEALTH - NSBHCHARTREVIEWVS_PSY_A_CORE FT
Vital Signs Last 24 Hrs  T(C): 36.3 (12 Oct 2019 09:05), Max: 36.3 (12 Oct 2019 09:05)  T(F): 97.4 (12 Oct 2019 09:05), Max: 97.4 (12 Oct 2019 09:05)  HR: --  BP: --  BP(mean): --  RR: 14 (12 Oct 2019 09:05) (14 - 14)  SpO2: 100% (12 Oct 2019 09:05) (100% - 100%)
Vital Signs Last 24 Hrs  T(C): 36.4 (17 Oct 2019 07:48), Max: 36.4 (17 Oct 2019 07:48)  T(F): 97.5 (17 Oct 2019 07:48), Max: 97.5 (17 Oct 2019 07:48)    RR: 16 (17 Oct 2019 07:48) (16 - 16)  SpO2: 100% (17 Oct 2019 07:48) (100% - 100%)
Vital Signs Last 24 Hrs  T(C): 36.5 (28 Oct 2019 08:53), Max: 36.8 (27 Oct 2019 20:23)  T(F): 97.7 (28 Oct 2019 08:53), Max: 98.3 (27 Oct 2019 20:23)  HR: 72 (27 Oct 2019 20:23) (72 - 72)  BP: 113/69 (27 Oct 2019 20:23) (113/69 - 113/69)  BP(mean): --  RR: 14 (28 Oct 2019 08:53) (14 - 14)  SpO2: 99% (28 Oct 2019 08:53) (99% - 99%)
Vital Signs Last 24 Hrs  T(C): 36.6 (11 Oct 2019 08:01), Max: 36.6 (11 Oct 2019 08:01)  T(F): 97.8 (11 Oct 2019 08:01), Max: 97.8 (11 Oct 2019 08:01)  HR: --  BP: --  BP(mean): --  RR: 16 (11 Oct 2019 08:01) (16 - 16)  SpO2: 100% (11 Oct 2019 08:01) (100% - 100%)
Vital Signs Last 24 Hrs  T(C): 36.6 (16 Oct 2019 08:52), Max: 36.6 (16 Oct 2019 08:52)  T(F): 97.9 (16 Oct 2019 08:52), Max: 97.9 (16 Oct 2019 08:52)  HR: --  BP: --  BP(mean): --  RR: 14 (16 Oct 2019 08:52) (14 - 14)  SpO2: 99% (16 Oct 2019 08:52) (99% - 99%)
Vital Signs Last 24 Hrs  T(C): 36.7 (13 Oct 2019 09:32), Max: 36.7 (13 Oct 2019 09:32)  T(F): 98 (13 Oct 2019 09:32), Max: 98 (13 Oct 2019 09:32)  HR: --  BP: --  BP(mean): --  RR: 14 (13 Oct 2019 09:32) (14 - 14)  SpO2: 99% (13 Oct 2019 09:32) (99% - 99%)
Vital Signs Last 24 Hrs  T(C): 36.7 (14 Oct 2019 07:51), Max: 36.7 (14 Oct 2019 07:51)  T(F): 98.1 (14 Oct 2019 07:51), Max: 98.1 (14 Oct 2019 07:51)  HR: --  BP: --  BP(mean): --  RR: 16 (14 Oct 2019 07:51) (16 - 16)  SpO2: 100% (14 Oct 2019 07:51) (100% - 100%)
Vital Signs Last 24 Hrs  T(C): 36.8 (21 Oct 2019 08:38), Max: 36.8 (21 Oct 2019 08:38)  T(F): 98.3 (21 Oct 2019 08:38), Max: 98.3 (21 Oct 2019 08:38)    RR: 14 (21 Oct 2019 08:38) (14 - 14)  SpO2: 100% (21 Oct 2019 08:38) (100% - 100%)
Vital Signs Last 24 Hrs  T(C): 36.8 (30 Oct 2019 07:38), Max: 36.8 (30 Oct 2019 07:38)  T(F): 98.2 (30 Oct 2019 07:38), Max: 98.2 (30 Oct 2019 07:38)  HR: --  BP: --  BP(mean): --  RR: 16 (30 Oct 2019 07:38) (16 - 16)  SpO2: 99% (30 Oct 2019 07:38) (99% - 99%)
Vital Signs Last 24 Hrs  T(C): 36.8 (23 Oct 2019 07:14), Max: 36.8 (23 Oct 2019 07:14)  T(F): 98.2 (23 Oct 2019 07:14), Max: 98.2 (23 Oct 2019 07:14)  HR: -- 59 sit and 59 stand   BP: -- 112/60 sit and 106/74 stand   BP(mean): --  RR: 16 (23 Oct 2019 07:14) (16 - 16)  SpO2: 100% (23 Oct 2019 07:14) (100% - 100%)
pt refused
Vital Signs Last 24 Hrs  T(C): 36.8 (21 Oct 2019 08:38), Max: 36.8 (21 Oct 2019 08:38)  T(F): 98.3 (21 Oct 2019 08:38), Max: 98.3 (21 Oct 2019 08:38)    RR: 14 (21 Oct 2019 08:38) (14 - 14)  SpO2: 100% (21 Oct 2019 08:38) (100% - 100%)
Vital Signs Last 24 Hrs  T(C): 36.8 (18 Oct 2019 09:11), Max: 36.8 (18 Oct 2019 09:11)  T(F): 98.2 (18 Oct 2019 09:11), Max: 98.2 (18 Oct 2019 09:11)  HR: -- 64 sit and 74 stand   BP: --101/62 sit and 115/61 stand     RR: 14 (18 Oct 2019 09:11) (14 - 14)  SpO2: 100% (18 Oct 2019 09:11) (100% - 100%)
Vital Signs Last 24 Hrs  T(C): 36.8 (31 Oct 2019 08:02), Max: 36.8 (31 Oct 2019 08:02)  T(F): 98.3 (31 Oct 2019 08:02), Max: 98.3 (31 Oct 2019 08:02)  HR: --  BP: --  BP(mean): --  RR: 16 (31 Oct 2019 08:02) (16 - 16)  SpO2: 99% (31 Oct 2019 08:02) (99% - 99%)
Vital Signs Last 24 Hrs  T(C): 36.9 (24 Oct 2019 08:08), Max: 36.9 (24 Oct 2019 08:08)  T(F): 98.4 (24 Oct 2019 08:08), Max: 98.4 (24 Oct 2019 08:08)  HR: 62 (23 Oct 2019 20:03) (62 - 62)  BP: 112/59 (23 Oct 2019 20:03) (112/59 - 112/59)  BP(mean): --  RR: 14 (24 Oct 2019 08:08) (14 - 16)  SpO2: 98% (24 Oct 2019 08:08) (98% - 100%)
Vital Signs Last 24 Hrs  T(C): 36.5 (15 Oct 2019 07:54), Max: 36.5 (15 Oct 2019 07:54)  T(F): 97.7 (15 Oct 2019 07:54), Max: 97.7 (15 Oct 2019 07:54)  HR: --  BP: --  BP(mean): --  RR: 16 (15 Oct 2019 07:54) (16 - 16)  SpO2: 100% (15 Oct 2019 07:54) (100% - 100%)
Vital Signs Last 24 Hrs  T(C): 36.6 (25 Oct 2019 08:26), Max: 36.6 (25 Oct 2019 08:26)  T(F): 97.8 (25 Oct 2019 08:26), Max: 97.8 (25 Oct 2019 08:26)  HR: -- 63 sit and 67 stand   BP: -- 106/64 sit and 118/62    RR: 14 (25 Oct 2019 08:26) (14 - 14)  SpO2: 100% (25 Oct 2019 08:26) (100% - 100%)
wnl

## 2019-10-31 NOTE — DISCHARGE NOTE BEHAVIORAL HEALTH - NSBHDCTHERAPYFT_PSY_A_CORE
Individual and group therapy, medication management, psychoeducation, safety planning, discharge planning, family meetings.

## 2019-10-31 NOTE — PROGRESS NOTE BEHAVIORAL HEALTH - PERCEPTIONS
No abnormalities

## 2019-10-31 NOTE — DISCHARGE NOTE BEHAVIORAL HEALTH - NSBHDCHOUSINGFT_PSY_A_CORE
Will stay with mother temporarily:    Kamila Ragsdale  [address] [place] [address]  ([phone])  Mom's Cell: [phone])    SPA Application has been made.  You have an interview for [facility] on Monday 11/4 at 10:30 with Beatriz Roberts the following address:    [facility] [address] [place] [address]  If there is any problem with this please call Coleen Beckwith at ([phone])    You have a referral pending for temporary housing at the following programs:    [facility]  [address] [place] [address]  ([phone])  Director:  Scottie Beasley    [facility] application filed for Respite care.    You can call Scottie De Jesus at [facility] Division of Mental Hygiene Services at ([phone]) to inquire about openings if necessary. Will stay with mother temporarily:    Kamila Ragsdale  [address] [place] [address]  ([phone])  Mom's Cell: [phone])    SPA Application has been made.  You have an interview for [facility] on Monday 11/4 at 10:30 with Beatriz Roberts the following address:    [facility] [address] [place] [address]  If there is any problem with this please call Coleen Beckwith at ([phone])    You have a referral pending for temporary housing at the following programs:    [facility]  [address] [place] [address]  ([phone])  Director:  Scottie Beasley    [facility] application filed for Respite care.    You can call Scottie De Jesus at [facility] Division of Mental Hygiene Services at ([phone]) to inquire about openings if necessary.

## 2019-10-31 NOTE — DISCHARGE NOTE BEHAVIORAL HEALTH - FAMILY HISTORY OF PSYCHIATRIC ILLNESS
Family Hx:  Both grandmothers reportedly have hx of mental illness, one with schizophrenia..  Father reportedly has hx of cocaine abuse.  Uncle has hx of meth. abuse.

## 2019-10-31 NOTE — PROGRESS NOTE BEHAVIORAL HEALTH - NSBHADMITIPOBSFT_PSY_A_CORE
pt denies any suicidal ideation or plan
pt denies any suicidal ideation
pt denies any suicidal ideation or plan

## 2019-10-31 NOTE — DISCHARGE NOTE BEHAVIORAL HEALTH - MEDICATION SUMMARY - MEDICATIONS TO TAKE
I will START or STAY ON the medications listed below when I get home from the hospital:    prazosin 1 mg oral capsule  -- 1 cap(s) by mouth once a day (at bedtime)  -- Indication: For PTSD (post-traumatic stress disorder)    Depakote 500 mg oral delayed release tablet  -- 3 tab(s) by mouth once a day (at bedtime)   -- Do not take this drug if you are pregnant.  It is very important that you take or use this exactly as directed.  Do not skip doses or discontinue unless directed by your doctor.  May cause drowsiness.  Alcohol may intensify this effect.  Use care when operating dangerous machinery.  Swallow whole.  Do not crush.    -- Indication: For Schizoaffective disorder    traZODone 100 mg oral tablet  -- 1 tab(s) by mouth once a day (at bedtime), As needed, Insomnia  -- Indication: For insomnia    benztropine 1 mg oral tablet  -- 1 tab(s) by mouth 2 times a day MDD:total 2 mg po q day ( EPS prophylaxis)  -- Indication: For Prophylaxis for eps    fluPHENAZine  -- every other week  -- Indication: For insomnia    fluPHENAZine 10 mg oral tablet  -- 1 tab(s) by mouth once a day (at bedtime) -for anxiety   -- Indication: For Schizoaffective disorder, bipolar type    melatonin 5 mg oral tablet  -- 1 tab(s) by mouth once a day (at bedtime)  -- Indication: For insomnai    nicotine 21 mg/24 hr transdermal film, extended release  -- 1 patch by transdermal patch once a day   -- Indication: For Nicotine replacement    nicotine 2 mg oral transmucosal lozenge  -- 1 lozenge oral transmucosal every 2 hours  -- Indication: For Nicotine replacement

## 2019-10-31 NOTE — PROGRESS NOTE BEHAVIORAL HEALTH - NSBHATTESTSEENBY_PSY_A_CORE
attending Psychiatrist without NP/Trainee

## 2019-10-31 NOTE — DISCHARGE NOTE BEHAVIORAL HEALTH - PROVIDER TOKENS
FREE:[LAST:[[facility]],FIRST:[[facility]],PHONE:[(   )    -],FAX:[(   )    -]]

## 2019-10-31 NOTE — DISCHARGE NOTE BEHAVIORAL HEALTH - NSBHDCSUBSTHXFT_PSY_A_CORE
Hx of daily marijuana abuse and sporadic use of Meth, janett, LSD, cocaine, ecstasy and dilaudidx1.  Went to [place] rehab in [place] [id number], did well.  Went to longterm house after and did well.  Was discharged to an unsupervised apt. last summer and started smoking marijuana again.  Pt is motivated to stay clean and sober and to attend a substance abuse program.  Unfortunately he could not be referred to a CDIOP Pgrm from the hospital as he has medicare primary, does not have [facility] Medicaid yet, most programs do not take medicare primary and transportation is an issue for the ones that do take medicare.  When he has medicaid he is willing to attend [facility]. In the interim pt will be seen at [facility].  Pt is very motivated.

## 2019-10-31 NOTE — DISCHARGE NOTE BEHAVIORAL HEALTH - NSBHDCADDFT_PSY_A_CORE
10-08 [id number] 5.0  10-13 Chol 107 LDL 56 HDL 29<L> Trig 110, 10-10 Chol 108 LDL 59 HDL 34<L> Trig 77    Ventricular Rate 50 BPM    Atrial Rate 50 BPM    P-R Interval 132 ms    QRS Duration 88 ms    Q-T Interval 420 ms    QTC Calculation(Bezet) 382 ms    P Axis 55 degrees    R Axis 84 degrees    T Axis 60 degrees    Diagnosis Line Sinus bradycardia  Early repolarization  Otherwise normal ECG    Confirmed by ESTHELA RAMOS, COLLIN ([id number]) on 10/8/2019 12:51:52 PM

## 2019-10-31 NOTE — PROGRESS NOTE BEHAVIORAL HEALTH - BEHAVIOR
Cooperative
Cooperative
Hostile/Cooperative
Cooperative
Cooperative/Hostile
Hostile/Cooperative
Cooperative
Cooperative/Hostile
Cooperative/Hostile
Cooperative
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
## 2019-10-31 NOTE — DISCHARGE NOTE BEHAVIORAL HEALTH - CONDITIONS AT DISCHARGE
Patient alert, oriented x3, pleasant and cooperative. Patient denies thoughts to harm himself or others. Nurse and  reviewed discharge plan with patient who accepted and is in agreement with plan. Patient received a copy of his discharge paperwork. All belongings returned to patient.

## 2019-10-31 NOTE — PROGRESS NOTE BEHAVIORAL HEALTH - NS ED BHA AXIS I SECONDARY1 CODE FT
F43.10
Z91.19
F43.10
F43.10
Z91.19
F43.10
F23
F43.10
F43.10
F23
F43.10
Z91.19
F43.10
Z91.19
Z91.19
F19.10
F19.10

## 2019-10-31 NOTE — PROGRESS NOTE BEHAVIORAL HEALTH - PROBLEM SELECTOR PLAN 3
clonidine 0.1mg po hs for nightmare and flash back
clonidine 0.1mg po hs for nightmare and flash back
encourage to attend groups

## 2019-10-31 NOTE — DISCHARGE NOTE BEHAVIORAL HEALTH - NSBHDCCRISISPLAN2FT_PSY_A_CORE
Tell your family, discuss in treatment at [facility], go to an AA or NA Meeting.

## 2019-10-31 NOTE — DISCHARGE NOTE BEHAVIORAL HEALTH - NSBHDCSWCOMMENTSFT_PSY_A_CORE
Mark and his parents were educated about the importance of attending outpatient mental health treatment, taking his medication as prescribed, remaining clean and sober and about his discharge plan above.  He was advised not to stop taking any medication unless told to do so by a physician.

## 2019-10-31 NOTE — DISCHARGE NOTE BEHAVIORAL HEALTH - NSTOBACCOREFERRAL_GEN_A_CS
Yes Patient declined information/Declined referral for smoking cessation upon admission and discharge.

## 2019-10-31 NOTE — PROGRESS NOTE BEHAVIORAL HEALTH - THOUGHT CONTENT
Homicidality
Preoccupations
Unremarkable
Unremarkable
Preoccupations
Homicidality
Preoccupations
Unremarkable
Homicidality
Preoccupations
Preoccupations
Preoccupations/Ruminations
Preoccupations
Unremarkable
Unremarkable
Homicidality
Preoccupations
Unremarkable

## 2019-10-31 NOTE — DISCHARGE NOTE BEHAVIORAL HEALTH - NSBHDCCRISISPLAN1FT_PSY_A_CORE
Tell your family, tell staff at [facility], go to the nearest emergency room.  You may call 911 for assistance.

## 2019-10-31 NOTE — DISCHARGE NOTE BEHAVIORAL HEALTH - NSBHDCSUICSAFETYFT_PSY_A_CORE
Advised to return to hospital or go to nearest ED or call 911 or [phone] or [phone] hotlines for any severe, worsening or persistent symptoms including suicidal/homicidal ideations, intent or plans. Patient verbalized understanding of instructions.

## 2019-10-31 NOTE — PROGRESS NOTE BEHAVIORAL HEALTH - NSBHCHARTREVIEWINVESTIGATE_PSY_A_CORE FT
Ventricular Rate 50 BPM    Atrial Rate 50 BPM    P-R Interval 132 ms    QRS Duration 88 ms    Q-T Interval 420 ms    QTC Calculation(Bezet) 382 ms    P Axis 55 degrees    R Axis 84 degrees    T Axis 60 degrees    Diagnosis Line Sinus bradycardia  Early repolarization  Otherwise normal ECG    Confirmed by ESTHELA RAMOS, COLLIN ([id number]) on 10/8/2019 12
Ventricular Rate 50 BPM    Atrial Rate 50 BPM    P-R Interval 132 ms    QRS Duration 88 ms    Q-T Interval 420 ms    QTC Calculation(Bezet) 382 ms    P Axis 55 degrees    R Axis 84 degrees    T Axis 60 degrees    Diagnosis Line Sinus bradycardia  Early repolarization  Otherwise normal ECG    Confirmed by ESTHELA RAMOS, COLLIN ([id number]) on 10/8/2019 12:51:52 PM
Ventricular Rate 50 BPM    Atrial Rate 50 BPM    P-R Interval 132 ms    QRS Duration 88 ms    Q-T Interval 420 ms    QTC Calculation(Bezet) 382 ms    P Axis 55 degrees    R Axis 84 degrees    T Axis 60 degrees    Diagnosis Line Sinus bradycardia  Early repolarization  Otherwise normal ECG    Confirmed by ESTHELA RAMOS, COLLIN ([id number]) on 10/8/2019 12:51:52
Ventricular Rate 50 BPM    Atrial Rate 50 BPM    P-R Interval 132 ms    QRS Duration 88 ms    Q-T Interval 420 ms    QTC Calculation(Bezet) 382 ms    P Axis 55 degrees    R Axis 84 degrees    T Axis 60 degrees    Diagnosis Line Sinus bradycardia  Early repolarization  Otherwise normal ECG    Confirmed by ESTHELA RAMOS, COLLIN ([id number]) on 10/8/2019 12:51:52 PM
Ventricular Rate 50 BPM    Atrial Rate 50 BPM    P-R Interval 132 ms    QRS Duration 88 ms    Q-T Interval 420 ms    QTC Calculation(Bezet) 382 ms    P Axis 55 degrees    R Axis 84 degrees    T Axis 60 degrees    Diagnosis Line Sinus bradycardia  Early repolarization  Otherwise normal ECG    Confirmed by ESTHELA RAMOS, COLLIN ([id number]) on 10/8/2019 12:51:52
Ventricular Rate 50 BPM    Atrial Rate 50 BPM    P-R Interval 132 ms    QRS Duration 88 ms    Q-T Interval 420 ms    QTC Calculation(Bezet) 382 ms    P Axis 55 degrees    R Axis 84 degrees    T Axis 60 degrees    Diagnosis Line Sinus bradycardia  Early repolarization  Otherwise normal ECG    Confirmed by ESTHELA RAMOS, COLLIN ([id number]) on 10/8/2019 12:51:52 PM
Ventricular Rate 50 BPM    Atrial Rate 50 BPM    P-R Interval 132 ms    QRS Duration 88 ms    Q-T Interval 420 ms    QTC Calculation(Bezet) 382 ms    P Axis 55 degrees    R Axis 84 degrees    T Axis 60 degrees    Diagnosis Line Sinus bradycardia  Early repolarization  Otherwise normal ECG    Confirmed by ESTHELA RAMOS, COLLIN ([id number]) on 10/8/2019 12:51:52
Ventricular Rate 50 BPM    Atrial Rate 50 BPM    P-R Interval 132 ms    QRS Duration 88 ms    Q-T Interval 420 ms    QTC Calculation(Bezet) 382 ms    P Axis 55 degrees    R Axis 84 degrees    T Axis 60 degrees    Diagnosis Line Sinus bradycardia  Early repolarization  Otherwise normal ECG    Confirmed by ESTHELA RAMOS, COLLIN ([id number]) on 10/8/2019 12:51:52 PM
Ventricular Rate 50 BPM    Atrial Rate 50 BPM    P-R Interval 132 ms    QRS Duration 88 ms    Q-T Interval 420 ms    QTC Calculation(Bezet) 382 ms    P Axis 55 degrees    R Axis 84 degrees    T Axis 60 degrees    Diagnosis Line Sinus bradycardia  Early repolarization  Otherwise normal ECG    Confirmed by ESTHELA RAMOS, COLLIN ([id number]) on 10/8/2019 12:51:52 P
Ventricular Rate 51 BPM    Atrial Rate 51 BPM    P-R Interval 130 ms    QRS Duration 88 ms    Q-T Interval 438 ms    QTC Calculation(Bezet) 403 ms    P Axis 32 degrees    R Axis 57 degrees    T Axis 45 degrees    Diagnosis Line Sinus bradycardia with sinus arrhythmia  Otherwise normal ECG    Confirmed by ESTHELA RAMOS, COLLIN ([id number]) on 10/7/2019 11:44:04

## 2019-10-31 NOTE — DISCHARGE NOTE BEHAVIORAL HEALTH - NSBHDCMEDSFT_PSY_A_CORE
MEDICATIONS  (STANDING):  diVALproex DR 1000 milliGRAM(s) Oral at bedtime  diVALproex  milliGRAM(s) Oral daily  fluPHENAZine 10 milliGRAM(s) Oral at bedtime  fluPHENAZine 2.5 milliGRAM(s) Oral two times a day  fluPHENAZine decanoate Injectable, Long Acting 50 milliGRAM(s) IntraMuscular once  melatonin 5 milliGRAM(s) Oral at bedtime  nicotine - 21 mG/24Hr(s) Patch 1 Patch Transdermal daily  prazosin. 1 milliGRAM(s) Oral at bedtime  Pt is directed to continue with all medications until directed by his MD to change or discontinued with medications  Pt with deniesl of any suicidal ideation or plan Pt with denial of any side effects from medicatiobn

## 2019-10-31 NOTE — DISCHARGE NOTE BEHAVIORAL HEALTH - PAST PSYCHIATRIC HISTORY
See above.  Long hx of mental illness.  1st Admission to [facility] in 2012,  [facility] 2013, HH 2013, 2016 and 3/18/17.  Pt was in a dual DX rehab in [place] [id number] See above.  Long hx of mental illness.  1st Admission to [facility] in 2012,  ZH 2013, HH 2013, 2016 and 3/18/17.  Pt was in a dual DX rehab in [place]. [id number], did well, went to a alf house after and did well.

## 2019-10-31 NOTE — DISCHARGE NOTE BEHAVIORAL HEALTH - NSBHDCADDR1FT_A_CORE
[address] [place] [address]    Appt.  Tuesday 11/5 at 2:00 with January Parker.  Please arrive early for paperwork.

## 2019-10-31 NOTE — PROGRESS NOTE BEHAVIORAL HEALTH - SECONDARY DX2
Polysubstance abuse
Noncompliance
Polysubstance abuse
Noncompliance
Noncompliance
Polysubstance abuse
Noncompliance
Noncompliance

## 2019-10-31 NOTE — PROGRESS NOTE BEHAVIORAL HEALTH - PROBLEM SELECTOR PROBLEM 1
Schizoaffective disorder
Schizoaffective disorder, bipolar type
Schizoaffective disorder
Schizoaffective disorder, bipolar type
Schizoaffective disorder, bipolar type

## 2019-10-31 NOTE — PROGRESS NOTE BEHAVIORAL HEALTH - NSBHPTASSESSDT_PSY_A_CORE
09-Oct-2019 16:14
11-Oct-2019 17:07
12-Oct-2019 15:57
13-Oct-2019 16:11
14-Oct-2019 10:52
16-Oct-2019 21:16
17-Oct-2019 15:56
22-Oct-2019 14:44
24-Oct-2019 16:06
25-Oct-2019 14:09
28-Oct-2019 18:49
30-Oct-2019 18:17
15-Oct-2019 17:23
18-Oct-2019 12:25
31-Oct-2019 13:24
21-Oct-2019 17:13
10-Oct-2019 11:58
23-Oct-2019 13:43
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
## 2019-10-31 NOTE — PROGRESS NOTE BEHAVIORAL HEALTH - PRIMARY DX
Schizoaffective disorder, bipolar type
Schizophrenia, unspecified type
Schizoaffective disorder, bipolar type
Schizoaffective disorder, bipolar type
Schizoaffective disorder
Schizophrenia, unspecified type
Schizoaffective disorder
Schizoaffective disorder, bipolar type
Schizophrenia, unspecified type
Schizoaffective disorder
Schizoaffective disorder
Schizoaffective disorder, bipolar type

## 2019-10-31 NOTE — PROGRESS NOTE BEHAVIORAL HEALTH - GAIT / STATION
Normal gait / station

## 2019-10-31 NOTE — PROGRESS NOTE BEHAVIORAL HEALTH - NS ED BHA AXIS I PRIMARY CODE FT
F25.0
F20.9
F25.0
F25.0
F20.9
F25.9
F25.9
F25.0
F20.9
F25.9
F25.9
F25.0

## 2019-10-31 NOTE — PROGRESS NOTE BEHAVIORAL HEALTH - AFFECT RANGE
Full
Constricted
Constricted
Full
Full
Constricted
Full
Constricted
Constricted
Full
Full

## 2019-10-31 NOTE — PROGRESS NOTE BEHAVIORAL HEALTH - PROBLEM SELECTOR PROBLEM 2
Noncompliance
Polysubstance abuse
Noncompliance
Polysubstance abuse
Noncompliance

## 2019-10-31 NOTE — DISCHARGE NOTE BEHAVIORAL HEALTH - NSBHDCSUICFCTRSFT_PSY_A_CORE
1) psychosis -Pt is directed to continue with all medications until directed by his MD to change or discontinued with medications  2) substance - pt verbalized willing to attend aftercare with outpt rehab.  Pt with insurance with no inpt rehab, pt is aware of the community self help groups of AA and NA meetings  3. noncompliance - pt willing to be on prolixin decanoate

## 2019-10-31 NOTE — DISCHARGE NOTE BEHAVIORAL HEALTH - NSBHDCCRISISPROB1FT_PSY_A_CORE
Any thoughts of hurting yourself or others or return of any of the acute symptoms you had prior to admission to the hospital.

## 2019-10-31 NOTE — PROGRESS NOTE BEHAVIORAL HEALTH - AFFECT QUALITY
Euthymic
Euthymic
Depressed/Irritable
Irritable/Depressed
Euthymic
Irritable/Depressed
Euthymic
Depressed/Irritable
Irritable/Depressed
Euthymic

## 2019-10-31 NOTE — PROGRESS NOTE BEHAVIORAL HEALTH - PROBLEM SELECTOR PLAN 4
prolixin decanoate 50mg IM Q2 weeks to be given 10/31 next due 11/14
prolixin decanoate 50mg IM Q2 weeks to be given 10/31

## 2019-10-31 NOTE — PROGRESS NOTE BEHAVIORAL HEALTH - NSBHADMITDANGERSELF_PSY_A_CORE
suicidal ideation with plan and means
unable to care for self
suicidal ideation with plan and means

## 2019-10-31 NOTE — PROGRESS NOTE BEHAVIORAL HEALTH - SECONDARY DX1
Noncompliance
PTSD (post-traumatic stress disorder)
PTSD (post-traumatic stress disorder)
Noncompliance
PTSD (post-traumatic stress disorder)
Brief psychotic disorder
PTSD (post-traumatic stress disorder)
PTSD (post-traumatic stress disorder)
Brief psychotic disorder
PTSD (post-traumatic stress disorder)
Noncompliance
PTSD (post-traumatic stress disorder)
Noncompliance
Noncompliance
PTSD (post-traumatic stress disorder)
Polysubstance abuse
Polysubstance abuse

## 2019-10-31 NOTE — PROGRESS NOTE BEHAVIORAL HEALTH - NSBHLEGALSTATUS_PSY_A_CORE
9.13 (Voluntary)
9.39 (Emergency)
9.13 (Voluntary)
9.39 (Emergency)
9.13 (Voluntary)
9.13 (Voluntary)
9.39 (Emergency)
9.13 (Voluntary)
9.13 (Voluntary)
9.39 (Emergency)

## 2019-10-31 NOTE — DISCHARGE NOTE BEHAVIORAL HEALTH - NSBHDCRESPONSEFT_PSY_A_CORE
pt with euthymic mood and affect that is full and mood congruent Pt with denial of any suicidal ideation intent or plan .  Pt also with denial of any homicidal ideation intent or plan
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
## 2019-10-31 NOTE — DISCHARGE NOTE BEHAVIORAL HEALTH - MEDICATION SUMMARY - MEDICATIONS TO CHANGE
I will SWITCH the dose or number of times a day I take the medications listed below when I get home from the hospital:    divalproex sodium 500 mg oral delayed release tablet  -- 1 tab(s) by mouth 2 times a day MDD:total depakote dose 1250 mg po q day ( mood disorder)    divalproex sodium 250 mg oral delayed release tablet  -- 1 tab(s) by mouth once a day (at bedtime) MDD:total depakote dose 1250 mg po q day ( mood disorder)    fluPHENAZine 10 mg oral tablet  -- 1 tab(s) by mouth 2 times a day    Depakote 500 mg oral delayed release tablet  -- 1 tab(s) by mouth 2 times a day  -- Do not take this drug if you are pregnant.  It is very important that you take or use this exactly as directed.  Do not skip doses or discontinue unless directed by your doctor.  May cause drowsiness.  Alcohol may intensify this effect.  Use care when operating dangerous machinery.  Swallow whole.  Do not crush.    Depakote 250 mg oral delayed release tablet  -- 1 tab(s) by mouth once a day  -- Do not take this drug if you are pregnant.  It is very important that you take or use this exactly as directed.  Do not skip doses or discontinue unless directed by your doctor.  May cause drowsiness.  Alcohol may intensify this effect.  Use care when operating dangerous machinery.  Swallow whole.  Do not crush.    fluPHENAZine 10 mg oral tablet  -- 1 tab(s) by mouth 2 times a day  -- Avoid prolonged or excessive exposure to direct and/or artificial sunlight while taking this medication.  It is very important that you take or use this exactly as directed.  Do not skip doses or discontinue unless directed by your doctor.  May cause drowsiness.  Alcohol may intensify this effect.  Use care when operating dangerous machinery.  Obtain medical advice before taking any non-prescription drugs as some may affect the action of this medication.

## 2019-10-31 NOTE — PROGRESS NOTE BEHAVIORAL HEALTH - IMPULSE CONTROL
Normal
Normal
Impaired
Normal
Normal
Impaired
Impaired
Normal
Impaired
Impaired
Normal

## 2019-10-31 NOTE — PROGRESS NOTE BEHAVIORAL HEALTH - ESTIMATED DISCHARGE DATE
26-Oct-2019
22-Oct-2019
31-Oct-2019
26-Oct-2019
31-Oct-2019
18-Oct-2019
28-Oct-2019
31-Oct-2019
10-Oct-2019
26-Oct-2019
26-Oct-2019
31-Oct-2019
10-Oct-2019
10-Oct-2019
18-Oct-2019

## 2019-10-31 NOTE — PROGRESS NOTE BEHAVIORAL HEALTH - THOUGHT PROCESS
Linear

## 2019-10-31 NOTE — PROGRESS NOTE BEHAVIORAL HEALTH - ATTENTION / CONCENTRATION
Impaired
Normal
Impaired
Impaired
Normal
Normal
Impaired
Normal
Normal
Impaired
Normal

## 2019-10-31 NOTE — PROGRESS NOTE BEHAVIORAL HEALTH - PROBLEM SELECTOR PLAN 1
depakote level is 96 on 10/18/19    prolixin decanoate 25mg IM on 10/17/19  PT is stable for transfer to rehab. awaiting CSW clearance.
MEDICATIONS  (STANDING):  diVALproex DR 1000 milliGRAM(s) Oral at bedtime  diVALproex  milliGRAM(s) Oral daily  fluPHENAZine 10 milliGRAM(s) Oral daily  fluPHENAZine 10 milliGRAM(s) Oral at bedtime  fluPHENAZine 2.5 milliGRAM(s) Oral two times a day  melatonin 5 milliGRAM(s) Oral at bedtime
depakote level is 96 on 10/18/19    prolixin decanoate 25mg IM on 10/17/19
depakote level is 96 on 10/18/19    prolixin decanoate 25mg IM on 10/17/19  PT is stable for transfer to rehab. awaiting CSW clearance.
depakote level is 96 on 10/18/19    prolixin decanoate 25mg IM on 10/17/19  PT is stable for transfer to rehab. awaiting CSW clearance.
MEDICATIONS  (STANDING):  diVALproex DR 1000 milliGRAM(s) Oral at bedtime  diVALproex  milliGRAM(s) Oral daily  fluPHENAZine 10 milliGRAM(s) Oral daily  fluPHENAZine 10 milliGRAM(s) Oral at bedtime  fluPHENAZine 2.5 milliGRAM(s) Oral two times a day  melatonin 5 milliGRAM(s) Oral at bedtime
depakote 1000mg   level due10/18  prolxin  10mg po bid
depakote level is 96 on 10/18/19    prolixin decanoate 25mg IM on 10/17/19
depakote level is 96 on 10/18/19    prolixin decanoate 25mg IM on 10/17/19  PT is stable for transfer to rehab. awaiting CSW clearance.
depakote level is 96 on 10/18/19    prolixin decanoate 25mg IM on 10/17/19  PT is stable for transfer to rehab. awaiting CSW clearance.
depakote 500mg   level due  prolxin increased to 10mg po bid
depakote 1000mg   level due10/18  prolxin  10mg po bid  to have prolixin decanoate 25mg IM
depakote 500mg   level due  prolxin increased to 10mg po bid
depakote 1000mg    prolxin  10mg po bid  to have prolixin decanoate 25mg IM
restart of medications

## 2019-10-31 NOTE — PROGRESS NOTE BEHAVIORAL HEALTH - PROBLEM SELECTOR PLAN 2
PT IS COMPLIANT WITH MEDICATION WHILE IN THE HOSPITAL
pt is encouraged to attend groups for insight and coping skills  nicotine - 21 mG/24Hr(s) Patch 1 Patch Transdermal daily
PT IS COMPLIANT WITH MEDICATION WHILE IN THE HOSPITAL
pt is encouraged to attend groups for insight and coping skills  nicotine - 21 mG/24Hr(s) Patch 1 Patch Transdermal daily
PT IS COMPLIANT WITH MEDICATION WHILE IN THE HOSPITAL
encourage compliance with medication   place on injectable if possible

## 2019-10-31 NOTE — PROGRESS NOTE BEHAVIORAL HEALTH - NSBHFUPINTERVALCCFT_PSY_A_CORE
"I get nightmares"
"I still don't feel right."
"I think I'm ready to go"
"I want to go to rehab"
"I'm feeling alright"
"I'm still not feeling right."
"Still not better"
'still with some of the voices"
I am good
I am OK
"I feel ready to be discharged "
I am OK
I am good
"I'm still too tired"
I am good
"I'm feeling a bit better"

## 2019-10-31 NOTE — PROGRESS NOTE BEHAVIORAL HEALTH - NSBHCHARTREVIEWLAB_PSY_A_CORE FT
Lipid Profile (10.10.19 @ 08:42)    Total Cholesterol/HDL Ratio Measurement: 3.2 RATIO    Cholesterol, Serum: 108 mg/dL    Triglycerides, Serum: 77 mg/dL    HDL Cholesterol, Serum: 34: HDL Levels >/= 60 mg/dL are considered beneficial and a "negative" risk  factor.  Effective 08/15/2018: New reference range and interpretive comment. mg/dL    Direct LDL: 59: LDL Cholesterol (mg/dL) --- Interpretive Comment (for adults 18 and over)  Optimal LDL Level may vary based on clinical situation  Below 70                   Ideal for people at very high risk of heart  disease  Below 100                  Ideal for people at risk of heart disease  100 - 129                    [place]  130 - 159                    Borderline high  160 - 189                    High  190 and Above           Very high mg/dL
Lipid Profile (10.10.19 @ 08:42)    Total Cholesterol/HDL Ratio Measurement: 3.2 RATIO    Cholesterol, Serum: 108 mg/dL    Triglycerides, Serum: 77 mg/dL    HDL Cholesterol, Serum: 34: HDL Levels >/= 60 mg/dL are considered beneficial and a "negative" risk  factor.  Effective 08/15/2018: New reference range and interpretive comment. mg/dL    Direct LDL: 59: LDL Cholesterol (mg/dL) --- Interpretive Comment (for adults 18 and over)  Optimal LDL Level may vary based on clinical situation  Below 70                   Ideal for people at very high risk of heart  disease  Below 100                  Ideal for people at risk of heart disease  100 - 129                    [place]  130 - 159                    Borderline high  160 - 189                    High  190 and Above           Very high mg/dL
Lipid Profile in AM (10.13.19 @ 08:31)    Total Cholesterol/HDL Ratio Measurement: 3.7 RATIO    Cholesterol, Serum: 107 mg/dL    Triglycerides, Serum: 110 mg/dL    HDL Cholesterol, Serum: 29: HDL Levels >/= 60 mg/dL are considered beneficial and a "negative" risk  factor.  Effective 08/15/2018: New reference range and interpretive comment. mg/dL    Direct LDL: 56: LDL Cholesterol (mg/dL) --- Interpretive Comment (for adults 18 and over)  Optimal LDL Level may vary based on clinical situation  Below 70                   Ideal for people at very high risk of heart  disease  Below 100                  Ideal for people at risk of heart disease  100 - 129                    [place]  130 - 159                    Borderline high  160 - 189                    High  190 and Above           Very high mg/dL
VPA 96 on 10/18/19.
no new labs
VPA 96 on 10/18/19.
Alcohol, Blood (10.07.19 @ 11:22)    Alcohol, Blood: <10: TOXIC CONCENTRATIONS (mg/dL):  Flushing, Slowing of  Reflexes, Impaired Visual Acuity:     Depression of CNS:    > 100  Fatalities Reported:       > 400  Results reported as a "< number" are below reliably detectable limits and  considered negative  These ranges are intended as general guidelines.  Alcohol metabolism can vary widely among individuals.  This test  is approved for clinical and not for forensic purposes. mg/dL

## 2019-10-31 NOTE — PROGRESS NOTE BEHAVIORAL HEALTH - BODY HABITUS
Overweight
Overweight
Average build
Well nourished
Average build
Overweight
Average build
Well nourished
Well nourished
Overweight
Overweight
Average build
Average build
Well nourished

## 2019-11-01 RX ORDER — HYDROXYZINE HCL 10 MG
1 TABLET ORAL
Qty: 60 | Refills: 1
Start: 2019-11-01 | End: 2019-11-30

## 2019-11-01 NOTE — CHART NOTE - NSCHARTNOTEFT_GEN_A_CORE
Patient arrived home safely, picked up medications and denies suicidal ideation.  Having some difficulty sleeping and encouraged patient to discuss this with his outpatient provider at his follow up appointment on Tuesday.  Encouraged patient to call the unit if he needs to talk before his follow up appointment.  Patient appreciated this option given to him.

## 2019-11-01 NOTE — CHART NOTE - NSCHARTNOTEFT_GEN_A_CORE
Pt's mother who is his main caregiver , had  called and indicated that the pt needed to have prn for anxiety .  She indicated that he was "doing well" but when they picked up his medications they wanted to have the prn for anxiety,. Vistaril 25mg po Q6H PRN for anxiety #60 for 15 days.

## 2019-11-04 DIAGNOSIS — F19.10 OTHER PSYCHOACTIVE SUBSTANCE ABUSE, UNCOMPLICATED: ICD-10-CM

## 2019-11-04 DIAGNOSIS — F43.10 POST-TRAUMATIC STRESS DISORDER, UNSPECIFIED: ICD-10-CM

## 2019-11-04 DIAGNOSIS — Z91.19 PATIENT'S NONCOMPLIANCE WITH OTHER MEDICAL TREATMENT AND REGIMEN: ICD-10-CM

## 2019-11-04 DIAGNOSIS — F25.0 SCHIZOAFFECTIVE DISORDER, BIPOLAR TYPE: ICD-10-CM

## 2019-11-15 NOTE — CHART NOTE - NSCHARTNOTEFT_GEN_A_CORE
Patient's mother calling the hospital upset that [facility] did not give patient his IM and she is worried Mark will decompensate.  Called [facility] to discuss.  They stated that patient was there yesterday, shot was ordered however there was some issue with delivering the meds.  The pharmacy left them a message.  Keren at [facility] was going to look into it and call the pharmacy.  She reported they will figure it out and give patient the shot as soon as they have it.  Called mother back and left message that [facility] is working on the issue and will provide the meds as soon as they get it within a day.  Encouraged mom to make sure Raulito stays on the oral meds.

## 2020-03-17 ENCOUNTER — TRANSCRIPTION ENCOUNTER (OUTPATIENT)
Age: 30
End: 2020-03-17

## 2020-05-27 ENCOUNTER — EMERGENCY (EMERGENCY)
Facility: HOSPITAL | Age: 30
LOS: 1 days | Discharge: HOME | End: 2020-05-27
Attending: EMERGENCY MEDICINE
Payer: MEDICARE

## 2020-05-27 VITALS
DIASTOLIC BLOOD PRESSURE: 82 MMHG | SYSTOLIC BLOOD PRESSURE: 121 MMHG | HEART RATE: 102 BPM | OXYGEN SATURATION: 98 % | TEMPERATURE: 98 F | RESPIRATION RATE: 18 BRPM

## 2020-05-27 PROCEDURE — 99284 EMERGENCY DEPT VISIT MOD MDM: CPT

## 2020-05-27 PROCEDURE — 70450 CT HEAD/BRAIN W/O DYE: CPT

## 2020-05-27 RX ORDER — IBUPROFEN 200 MG
600 TABLET ORAL ONCE
Refills: 0 | Status: COMPLETED | OUTPATIENT
Start: 2020-05-27 | End: 2020-05-27

## 2020-05-27 RX ORDER — ACETAMINOPHEN 500 MG
650 TABLET ORAL ONCE
Refills: 0 | Status: COMPLETED | OUTPATIENT
Start: 2020-05-27 | End: 2020-05-27

## 2020-05-27 RX ORDER — METHOCARBAMOL 500 MG/1
1500 TABLET, FILM COATED ORAL ONCE
Refills: 0 | Status: COMPLETED | OUTPATIENT
Start: 2020-05-27 | End: 2020-05-27

## 2020-05-27 RX ADMIN — Medication 600 MILLIGRAM(S): at 22:48

## 2020-05-27 RX ADMIN — Medication 650 MILLIGRAM(S): at 22:48

## 2020-05-27 RX ADMIN — METHOCARBAMOL 1500 MILLIGRAM(S): 500 TABLET, FILM COATED ORAL at 22:47
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
## 2020-05-27 NOTE — ED PROVIDER NOTE - CHPI ED SYMPTOMS NEG
no loss of consciousness/no chest wall tenderness/no chest pain/no seizure/no vomiting/no change in level of consciousness/no weakness/no blurred vision
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
## 2020-05-27 NOTE — ED PROVIDER NOTE - PROGRESS NOTE DETAILS
Pt reassessed, pt feeling better at this time, vss, pt able to walk, talk and vocalized plan of action. Discussed in depth and explained to pt in depth the next steps that need to be taking including proper follow up with PCP or specialists. All incidental findings were discussed with pt as well. Pt verbalized their concerns and all questions were answered. Pt understands dispo and wants discharge. Given good instructions when to return to ED and importance of f/u.

## 2020-05-27 NOTE — ED PROVIDER NOTE - PATIENT PORTAL LINK FT
You can access the FollowMyHealth Patient Portal offered by [facility] by registering at the following website: [email or web address]. By joining [facility]’s FollowMyHealth portal, you will also be able to view your health information using other applications (apps) compatible with our system.

## 2020-05-27 NOTE — ED PROVIDER NOTE - PHYSICAL EXAMINATION
MSK- nt to palp of the cervical, thoracic, lumbar spine, tender ot palp of the thoracic paraspinal m, on the left sided just medial to the scapula, nt to palp of the scapula, FROM, 5/5 strength
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
## 2020-05-28 PROCEDURE — 70450 CT HEAD/BRAIN W/O DYE: CPT | Mod: 26

## 2020-06-14 NOTE — DISCHARGE NOTE BEHAVIORAL HEALTH - NSBHDCPHYSCONTACTFT_PSY_A_CORE
Implemented All Universal Safety Interventions:  [place] to call system. Call bell, personal items and telephone within reach. Instruct patient to call for assistance. Room bathroom lighting operational. Non-slip footwear when patient is off stretcher. Physically safe environment: no spills, clutter or unnecessary equipment. Stretcher in lowest position, wheels locked, appropriate side rails in place. Dr Martinez

## 2021-07-09 NOTE — ED BEHAVIORAL HEALTH ASSESSMENT NOTE - SUBSTANCE USE
He has tried multiple treatments over the last several weeks/months without improvement, would he like to see ENT? Yes

## 2021-09-17 ENCOUNTER — TRANSCRIPTION ENCOUNTER (OUTPATIENT)
Age: 31
End: 2021-09-17

## 2021-10-12 ENCOUNTER — TRANSCRIPTION ENCOUNTER (OUTPATIENT)
Age: 31
End: 2021-10-12

## 2022-03-02 NOTE — PATIENT PROFILE BEHAVIORAL HEALTH - NSBHCOPING_PSY_A_CORE
ineffective/drugs/uses THC daily Itraconazole Counseling:  I discussed with the patient the risks of itraconazole including but not limited to liver damage, nausea/vomiting, neuropathy, and severe allergy.  The patient understands that this medication is best absorbed when taken with acidic beverages such as non-diet cola or ginger ale.  The patient understands that monitoring is required including baseline LFTs and repeat LFTs at intervals.  The patient understands that they are to contact us or the primary physician if concerning signs are noted.

## 2022-11-26 NOTE — ED BEHAVIORAL HEALTH ASSESSMENT NOTE - VETERAN
Anesthesia Evaluation                  Airway   Mallampati: II  Dental      Pulmonary    (+) a smoker Former, COPD, asthma,sleep apnea,     ROS comment: Positive MONICA screen/Diagnosis, 2 or more mitigating factors used (non-supine position, no narcotics)    Cardiovascular     (+) hypertension,       Neuro/Psych  GI/Hepatic/Renal/Endo    (+) obesity,  GERD, GI bleeding ,     Musculoskeletal     Abdominal    Substance History      OB/GYN          Other                        Anesthesia Plan    ASA 3     MAC       Anesthetic plan, risks, benefits, and alternatives have been provided, discussed and informed consent has been obtained with: patient.        CODE STATUS:    Code Status (Patient has no pulse and is not breathing): CPR (Attempt to Resuscitate)  Medical Interventions (Patient has pulse or is breathing): Full Support      
No
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
## 2023-03-09 NOTE — ED PROVIDER NOTE - CROS ED PSYCH ALL NEG
Thank you for coming to the [facility] @ [facility]; please note the following instructions:    1. DECREASE LIPITOR TO 40 MG DAILY-new prescription was sent to your preferred pharmacy.    2. Dr. Uribe recommends to follow up in 1 year with fasting labs prior.  The cardiology team will contact you to schedule when the time gets closer.        If you have any questions regarding your visit please contact your care team:     Cardiology  Telephone Number   Yarelis TALLEY., RN  Yusra SUE, RN   Iliana SOUZA, RMDELPHINE BONNER, NAGI SIMON, Visit Facilitator  Tierra LYN [facility] [address] [phone] (option 1)   For scheduling appts:     [phone] (select option 1)       For the Device Clinic (Pacemakers and ICD's)  RN's :  Tona Jaime   During business hours: [phone]    *After business hours:  [phone] (select option 4)      Normal test result notifications will be released via [employer] or mailed within 7 business days.  All other test results, will be communicated via telephone once reviewed by your cardiologist.    If you need a medication refill please contact your pharmacy.  Please allow 3 business days for your refill to be completed.    As always, thank you for trusting us with your health care needs!           - - -

## 2023-04-11 NOTE — ED ADULT NURSE NOTE - NS ED NURSE REPORT GIVEN TO FT
impairments found/functional limitations in following categories/risk reduction/prevention/rehab potential/therapy frequency/predicted duration of therapy intervention/anticipated equipment needs at discharge/anticipated discharge recommendation
Ashley VERDE

## 2023-09-09 ENCOUNTER — NON-APPOINTMENT (OUTPATIENT)
Age: 33
End: 2023-09-09

## 2023-09-14 ENCOUNTER — NON-APPOINTMENT (OUTPATIENT)
Age: 33
End: 2023-09-14

## 2023-09-14 ENCOUNTER — APPOINTMENT (OUTPATIENT)
Dept: ORTHOPEDIC SURGERY | Facility: CLINIC | Age: 33
End: 2023-09-14
Payer: MEDICARE

## 2023-09-14 PROCEDURE — 72100 X-RAY EXAM L-S SPINE 2/3 VWS: CPT

## 2023-09-14 PROCEDURE — 99204 OFFICE O/P NEW MOD 45 MIN: CPT

## 2023-09-14 RX ORDER — METHYLPREDNISOLONE 4 MG/1
4 TABLET ORAL
Qty: 1 | Refills: 0 | Status: ACTIVE | COMMUNITY
Start: 2023-09-14 | End: 1900-01-01

## 2023-09-28 ENCOUNTER — APPOINTMENT (OUTPATIENT)
Dept: ORTHOPEDIC SURGERY | Facility: CLINIC | Age: 33
End: 2023-09-28
Payer: MEDICARE

## 2023-09-28 PROCEDURE — 99214 OFFICE O/P EST MOD 30 MIN: CPT

## 2023-09-28 RX ORDER — MELOXICAM 15 MG/1
15 TABLET ORAL DAILY
Qty: 30 | Refills: 0 | Status: ACTIVE | COMMUNITY
Start: 2023-09-28 | End: 1900-01-01

## 2023-10-05 ENCOUNTER — APPOINTMENT (OUTPATIENT)
Dept: MRI IMAGING | Facility: CLINIC | Age: 33
End: 2023-10-05
Payer: MEDICARE

## 2023-10-05 ENCOUNTER — OUTPATIENT (OUTPATIENT)
Dept: OUTPATIENT SERVICES | Facility: HOSPITAL | Age: 33
LOS: 1 days | End: 2023-10-05
Payer: MEDICARE

## 2023-10-05 DIAGNOSIS — M54.16 RADICULOPATHY, LUMBAR REGION: ICD-10-CM

## 2023-10-05 PROCEDURE — 72148 MRI LUMBAR SPINE W/O DYE: CPT

## 2023-10-05 PROCEDURE — 72148 MRI LUMBAR SPINE W/O DYE: CPT | Mod: 26,MH

## 2023-10-23 ENCOUNTER — APPOINTMENT (OUTPATIENT)
Dept: PHYSICAL MEDICINE AND REHAB | Facility: CLINIC | Age: 33
End: 2023-10-23
Payer: MEDICARE

## 2023-10-23 ENCOUNTER — APPOINTMENT (OUTPATIENT)
Dept: ORTHOPEDIC SURGERY | Facility: CLINIC | Age: 33
End: 2023-10-23

## 2023-10-23 VITALS
SYSTOLIC BLOOD PRESSURE: 124 MMHG | HEART RATE: 71 BPM | HEIGHT: 70 IN | RESPIRATION RATE: 14 BRPM | DIASTOLIC BLOOD PRESSURE: 77 MMHG | BODY MASS INDEX: 28.63 KG/M2 | WEIGHT: 200 LBS

## 2023-10-23 DIAGNOSIS — F17.200 NICOTINE DEPENDENCE, UNSPECIFIED, UNCOMPLICATED: ICD-10-CM

## 2023-10-23 DIAGNOSIS — M54.16 RADICULOPATHY, LUMBAR REGION: ICD-10-CM

## 2023-10-23 DIAGNOSIS — M79.18 MYALGIA, OTHER SITE: ICD-10-CM

## 2023-10-23 DIAGNOSIS — F12.90 CANNABIS USE, UNSPECIFIED, UNCOMPLICATED: ICD-10-CM

## 2023-10-23 PROCEDURE — 99204 OFFICE O/P NEW MOD 45 MIN: CPT

## 2023-10-23 RX ORDER — NAPROXEN 500 MG/1
500 TABLET ORAL
Qty: 60 | Refills: 0 | Status: ACTIVE | COMMUNITY
Start: 2023-10-23 | End: 1900-01-01

## 2023-10-23 RX ORDER — FLUPHENAZINE HCL 5 MG
TABLET ORAL
Refills: 0 | Status: ACTIVE | COMMUNITY

## 2023-10-23 RX ORDER — FENOFIBRATE 145 MG/1
TABLET ORAL
Refills: 0 | Status: ACTIVE | COMMUNITY

## 2025-05-08 NOTE — DISCHARGE NOTE BEHAVIORAL HEALTH - HAS THE PATIENT RECEIVED THE INFLUENZA VACCINE THIS SEASON?
CHADS VASc 5 chronically anticoagulated on Coumadin  Managed by PCP  Denies bleeding diatheses   no...